# Patient Record
Sex: FEMALE | Race: BLACK OR AFRICAN AMERICAN | Employment: FULL TIME | URBAN - METROPOLITAN AREA
[De-identification: names, ages, dates, MRNs, and addresses within clinical notes are randomized per-mention and may not be internally consistent; named-entity substitution may affect disease eponyms.]

---
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
## 2018-01-08 ENCOUNTER — OFFICE VISIT (OUTPATIENT)
Dept: FAMILY MEDICINE CLINIC | Age: 18
End: 2018-01-08

## 2018-01-08 VITALS
BODY MASS INDEX: 24.07 KG/M2 | DIASTOLIC BLOOD PRESSURE: 79 MMHG | RESPIRATION RATE: 18 BRPM | HEART RATE: 81 BPM | TEMPERATURE: 98.3 F | HEIGHT: 64 IN | WEIGHT: 141 LBS | SYSTOLIC BLOOD PRESSURE: 116 MMHG | OXYGEN SATURATION: 100 %

## 2018-01-08 DIAGNOSIS — L70.0 ACNE VULGARIS: ICD-10-CM

## 2018-01-08 DIAGNOSIS — Z30.431 IUD CHECK UP: Primary | ICD-10-CM

## 2018-01-08 RX ORDER — CLINDAMYCIN PHOSPHATE AND BENZOYL PEROXIDE 10; 50 MG/G; MG/G
GEL TOPICAL
Qty: 1 TUBE | Refills: 0 | Status: SHIPPED | OUTPATIENT
Start: 2018-01-08 | End: 2018-01-28 | Stop reason: SDUPTHER

## 2018-01-08 NOTE — PROGRESS NOTES
Subjective  Don Rogers is an 21 y.o. female who presents for IUD check and acne. Mirena IUD placed April 2017. She returned in May 2017 for follow up and pelvic exam was normal - Strings were visualized at that time. She was told to follow up in 6 months but patient did not return due to insurance lapse. No pelvic pain. Some intermittent cramping. No spotting. No menstrual periods. No problems with intercourse. Acne vulgaris: on face, has tried benzoyl peroxide, Proactiv, differin gel, without mild improvement. Very bothersome to her. Past Medical History - reviewed:  History reviewed. No pertinent past medical history. ROS  PELVIC: no pelvic pain, no dysuria      Physical Exam  Visit Vitals    /79 (BP 1 Location: Right arm, BP Patient Position: Sitting)    Pulse 81    Temp 98.3 °F (36.8 °C) (Oral)    Resp 18    Ht 5' 4\" (1.626 m)    Wt 141 lb (64 kg)    LMP 07/07/2017    SpO2 100%    BMI 24.2 kg/m2       General appearance - alert, well appearing, and in no distress  Eyes - clear conjunctivae  Chest - clear to auscultation, no wheezes or rhonchi, symmetric air entry  Heart - normal rate, regular rhythm, normal S1, S2, no murmurs  Abdomen - soft, nontender, nondistended, no pelvic tenderness. Skin - some comedones and hyperpigmented acne scars present on forehead and chin. Pelvic exam: chaperoned by Dr. Melinda Suarez and medical student, Jad Murcia. Normal external genitalia, vulva, vagina, cervix, uterus and adnexa. Cervical os visualized but IUD strings are not clearly apparent. Several cotton swabs were used to clear discharge and mucus but still unable to adequately visualize strings. Bimanual exam within normal limits, uncertain if strings were apparent. Assessment/Plan    ICD-10-CM ICD-9-CM    1. IUD check up Z30.431 V25.42 US TRANSVAGINAL   2.  Acne vulgaris L70.0 706.1 clindamycin-benzoyl Peroxide (DUAC) 1.2 %(1 % base) -5 % SR topical gel     IUD check up - almost 1 year since placement. - Unable to confidently say that IUD strings are in place  - Patient examined with Dr. Squire Landau. Will obtain TVUS to ensure placement. Acne vulgaris  - topical benzoyl peroxide/clinda sent to pharmacy. (skin irritation with topical tretinoin)      Follow-up Disposition:  Return if symptoms worsen or fail to improve. I have discussed the diagnosis with the patient and the intended plan as seen in the above orders. The patient has received an after-visit summary and questions were answered concerning future plans. I have discussed medication side effects and warnings with the patient as well.       Darcy Irby MD  Family Medicine Resident

## 2018-01-08 NOTE — PATIENT INSTRUCTIONS
Acne: Care Instructions  Your Care Instructions  Acne is a skin problem that shows up as blackheads, whiteheads, and pimples. It most often affects the face, neck, and upper body. Acne occurs when oil and dead skin cells clog the skin's pores. Acne usually starts during the teen years and often lasts into adulthood. Gentle cleansing every day controls most mild acne. If home treatment does not work, your doctor may prescribe creams, antibiotics, or a stronger medicine called isotretinoin. Sometimes birth control pills help women who have monthly acne flare-ups. Follow-up care is a key part of your treatment and safety. Be sure to make and go to all appointments, and call your doctor if you are having problems. It's also a good idea to know your test results and keep a list of the medicines you take. How can you care for yourself at home? · Gently wash your face 1 or 2 times a day with warm (not hot) water and a mild soap or cleanser. Always rinse well. · Use an over-the-counter lotion or gel that contains benzoyl peroxide. Start with a small amount of 2.5% benzoyl peroxide and increase the strength as needed. Benzoyl peroxide works well for acne, but you may need to use it for up to 2 months before your acne starts to improve. · Apply acne cream, lotion, or gel to all the places you get pimples, blackheads, or whiteheads, not just where you have them now. Follow the instructions carefully. If your skin gets too dry and scaly or red and sore, reduce the amount. For the best results, apply medicines as directed. Try not to miss doses. · Do not squeeze or pick pimples and blackheads. This can cause infection and scarring. · Use only oil-free makeup, sunscreen, and other skin care products that will not clog your pores. · Wash your hair every day, and try to keep it off your face and shoulders. Consider pinning it back or cutting it short. When should you call for help?   Watch closely for changes in your health, and be sure to contact your doctor if:  ? · You have tried home treatment for 6 to 8 weeks and your acne is not better or gets worse. Your doctor may need to add to or change your treatment. ? · Your pimples become large and hard or filled with fluid. ? · Scars form after pimples heal.   ? · You feel sad or hopeless, lack energy, or have other signs of depression while you are taking the prescription medicine isotretinoin. ? · You start to have other symptoms, such as facial hair growth in women or bone and muscle pain. Where can you learn more? Go to http://mina-bradley.info/. Enter V108 in the search box to learn more about \"Acne: Care Instructions. \"  Current as of: October 13, 2016  Content Version: 11.4  © 0533-7502 [employer]. Care instructions adapted under license by [employer] (which disclaims liability or warranty for this information). If you have questions about a medical condition or this instruction, always ask your healthcare professional. [place] [address] any warranty or liability for your use of this information. Pelvic Ultrasound for Women: About This Test  What is it? A pelvic ultrasound test uses sound waves to make a picture of the inside of the lower belly (pelvis). It allows your doctor to see your bladder, cervix, uterus, fallopian tubes, and ovaries. The sound waves create a picture on a video monitor. The test can be done in two ways:  · Transabdominal. A small handheld device (transducer) is passed back and forth over your lower belly. · Transvaginal. A thin, lubricated transducer is placed in your vagina. Why is this test done? A pelvic ultrasound test is done to:  · Find the cause of urinary problems. · Find out what's causing pelvic pain. · Look for causes of vaginal bleeding and menstrual problems. · Check for growths or masses like ovarian cysts or uterine fibroids.   · See if a fertilized egg is growing outside the uterus. This is called a tubal pregnancy. · Confirm the stage of a pregnancy and check the baby's heartbeat. · Look for the correct placement of an intrauterine device (IUD). How can you prepare for the test?  · If you are having a transabdominal ultrasound, your doctor will ask you to drink 4 to 6 glasses of juice or water about an hour before the test. This will fill your bladder. If you can't fill your bladder, it can be filled with water through a thin, flexible tube (catheter). · If you are having both transabdominal and transvaginal ultrasounds done, you'll start with a full bladder. You will be asked to empty it before the transvaginal ultrasound. What happens before the test?  · You will need to remove any jewelry that might be in the way of the ultrasound. · You will need to take off most of your clothes below the waist. You'll be given a gown to wear during the test.  What happens during the test?  For a transabdominal ultrasound:  · You lie down on your back on an exam table. · A warm gel will be spread on your lower belly. This improves the transmission of the sound waves. The handheld transducer is pressed against your belly and gently moved back and forth. A picture of the organs can be seen on a video monitor. For a transvaginal ultrasound:  · You lie down on your back on an exam table with your hips slightly raised. · The tip of a thin, lubricated transducer probe is gently inserted into your vagina. The transducer may be moved around to get a complete view. The images from the test are shown on a video monitor. What else should you know about the test?  · With a transabdominal ultrasound, you will feel light pressure from the transducer as it passes over your belly. If you have an injury or pelvic pain, the pressure may be painful. · With a transvaginal ultrasound, you may feel some discomfort from the transducer probe as it is put into your vagina.   · You will not hear or feel the sound waves. How long does the test take? · The transabdominal ultrasound test will take about 30 minutes. · The transvaginal ultrasound test will take 15 to 30 minutes. What happens after the test?  · You will probably be able to go home right away. · You can go back to your usual activities right away. Follow-up care is a key part of your treatment and safety. Be sure to make and go to all appointments, and call your doctor if you are having problems. It's also a good idea to keep a list of the medicines you take. Ask your doctor when you can expect to have your test results. Where can you learn more? Go to http://mina-bradley.info/. Enter I684 in the search box to learn more about \"Pelvic Ultrasound for Women: About This Test.\"  Current as of: October 13, 2016  Content Version: 11.4  © 0812-9678 [employer]. Care instructions adapted under license by [employer] (which disclaims liability or warranty for this information). If you have questions about a medical condition or this instruction, always ask your healthcare professional. [place] [address] any warranty or liability for your use of this information.

## 2018-01-08 NOTE — PROGRESS NOTES
Chief Complaint   Patient presents with   Jovan Deeds IUD     1. Have you been to the ER, urgent care clinic since your last visit? Hospitalized since your last visit? No    2. Have you seen or consulted any other health care providers outside of the [facility] since your last visit? Include any pap smears or colon screening.  No

## 2018-01-08 NOTE — MR AVS SNAPSHOT
Visit Information Date & Time Provider Department Dept. Phone Encounter #  
 1/8/2018  9:05 AM Basia Covington, Perry Ivey Ervin [phone] [id number] Upcoming Health Maintenance Date Due Hepatitis A Peds Age 1-18 (1 of 2 - Standard Series) 7/21/1999 HPV AGE 9Y-26Y (3 of 3 - Female 3 Dose Series) 3/21/2017 Influenza Age 5 to Adult 8/1/2017 DTaP/Tdap/Td series (7 - Td) 7/29/2019 Allergies as of 1/8/2018  Review Complete On: 1/8/2018 By: Ryan Smith LPN Severity Noted Reaction Type Reactions Latex  02/29/2016    Hives Current Immunizations  Reviewed on 8/19/2016 Name Date DTaP 8/8/2003, 10/26/1999, 2/4/1999, 1998, 1998 HPV (9-valent) 11/21/2016, 8/19/2016 Hep B Vaccine 2/4/1999, 1998, 1998 Hib 10/26/1999, 2/4/1999, 1998, 1998 MMR 8/8/2003, 7/26/1999 Meningococcal (MPSV4) Vaccine 2/29/2016  2:31 PM  
 Poliovirus vaccine 8/8/2003, 7/26/1999, 1998, 1998 Tdap 7/29/2009 Varicella Virus Vaccine 2/29/2016  2:30 PM, 10/19/2000 Not reviewed this visit You Were Diagnosed With   
  
 Codes Comments IUD check up    -  Primary ICD-10-CM: K86.828 ICD-9-CM: V25.42 Acne vulgaris     ICD-10-CM: L70.0 ICD-9-CM: 706.1 Vitals BP Pulse Temp Resp Height(growth percentile) Weight(growth percentile) 116/79 (74 %/ 92 %)* (BP 1 Location: Right arm, BP Patient Position: Sitting) 81 98.3 °F (36.8 °C) (Oral) 18 5' 4\" (1.626 m) (45 %, Z= -0.11) 141 lb (64 kg) (71 %, Z= 0.57) LMP SpO2 BMI OB Status Smoking Status 07/07/2017 100% 24.2 kg/m2 (74 %, Z= 0.66) Having regular periods Never Smoker *BP percentiles are based on NHBPEP's 4th Report Growth percentiles are based on CDC 2-20 Years data. Vitals History BMI and BSA Data Body Mass Index Body Surface Area  
 24.2 kg/m 2 1.7 m 2 Preferred Pharmacy Pharmacy Name Phone [facility] [place], [address] [place] AT [facility] [phone] Your Updated Medication List  
  
   
This list is accurate as of: 1/8/18  9:26 AM.  Always use your most recent med list.  
  
  
  
  
 clindamycin-benzoyl Peroxide 1.2 %(1 % base) -5 % SR topical gel Commonly known as:  DUAC Apply  to affected area nightly. ferrous sulfate 325 mg (65 mg iron) tablet TAKE ONE TABLET BY MOUTH ONCE DAILY. TAKE WITH ORANGE JUICE  
  
 miSOPROStol 200 mcg tablet Commonly known as:  CYTOTEC Take 1 tablet by mouth the night before procedure and then 1 tablet 2 hours before procedure  
  
 norethindrone-ethinyl estradiol 1 mg-20 mcg (21)/75 mg (7) Tab Commonly known as:  Maryjean Allegra FE 1/20 Take 1 Tab by mouth daily. Prescriptions Sent to Pharmacy Refills  
 clindamycin-benzoyl Peroxide (DUAC) 1.2 %(1 % base) -5 % SR topical gel 0 Sig: Apply  to affected area nightly. Class: Normal  
 Pharmacy: [facility] [address] Ph #: [phone] Route: Topical  
  
To-Do List   
 01/12/2018 Imaging:  US TRANSVAGINAL Patient Instructions Acne: Care Instructions Your Care Instructions Acne is a skin problem that shows up as blackheads, whiteheads, and pimples. It most often affects the face, neck, and upper body. Acne occurs when oil and dead skin cells clog the skin's pores. Acne usually starts during the teen years and often lasts into adulthood. Gentle cleansing every day controls most mild acne. If home treatment does not work, your doctor may prescribe creams, antibiotics, or a stronger medicine called isotretinoin. Sometimes birth control pills help women who have monthly acne flare-ups. Follow-up care is a key part of your treatment and safety.  Be sure to make and go to all appointments, and call your doctor if you are having problems. It's also a good idea to know your test results and keep a list of the medicines you take. How can you care for yourself at home? · Gently wash your face 1 or 2 times a day with warm (not hot) water and a mild soap or cleanser. Always rinse well. · Use an over-the-counter lotion or gel that contains benzoyl peroxide. Start with a small amount of 2.5% benzoyl peroxide and increase the strength as needed. Benzoyl peroxide works well for acne, but you may need to use it for up to 2 months before your acne starts to improve. · Apply acne cream, lotion, or gel to all the places you get pimples, blackheads, or whiteheads, not just where you have them now. Follow the instructions carefully. If your skin gets too dry and scaly or red and sore, reduce the amount. For the best results, apply medicines as directed. Try not to miss doses. · Do not squeeze or pick pimples and blackheads. This can cause infection and scarring. · Use only oil-free makeup, sunscreen, and other skin care products that will not clog your pores. · Wash your hair every day, and try to keep it off your face and shoulders. Consider pinning it back or cutting it short. When should you call for help? Watch closely for changes in your health, and be sure to contact your doctor if: 
? · You have tried home treatment for 6 to 8 weeks and your acne is not better or gets worse. Your doctor may need to add to or change your treatment. ? · Your pimples become large and hard or filled with fluid. ? · Scars form after pimples heal.  
? · You feel sad or hopeless, lack energy, or have other signs of depression while you are taking the prescription medicine isotretinoin. ? · You start to have other symptoms, such as facial hair growth in women or bone and muscle pain. Where can you learn more? Go to http://mina-bradley.info/. Enter V108 in the search box to learn more about \"Acne: Care Instructions. \" 
 Current as of: October 13, 2016 Content Version: 11.4 © 7023-8512 milabent. Care instructions adapted under license by BrightBytes (which disclaims liability or warranty for this information). If you have questions about a medical condition or this instruction, always ask your healthcare professional. [address] any warranty or liability for your use of this information. Pelvic Ultrasound for Women: About This Test 
What is it? A pelvic ultrasound test uses sound waves to make a picture of the inside of the lower belly (pelvis). It allows your doctor to see your bladder, cervix, uterus, fallopian tubes, and ovaries. The sound waves create a picture on a video monitor. The test can be done in two ways: · Transabdominal. A small handheld device (transducer) is passed back and forth over your lower belly. · Transvaginal. A thin, lubricated transducer is placed in your vagina. Why is this test done? A pelvic ultrasound test is done to: · Find the cause of urinary problems. · Find out what's causing pelvic pain. · Look for causes of vaginal bleeding and menstrual problems. · Check for growths or masses like ovarian cysts or uterine fibroids. · See if a fertilized egg is growing outside the uterus. This is called a tubal pregnancy. · Confirm the stage of a pregnancy and check the baby's heartbeat. · Look for the correct placement of an intrauterine device (IUD). How can you prepare for the test? 
· If you are having a transabdominal ultrasound, your doctor will ask you to drink 4 to 6 glasses of juice or water about an hour before the test. This will fill your bladder. If you can't fill your bladder, it can be filled with water through a thin, flexible tube (catheter). · If you are having both transabdominal and transvaginal ultrasounds done, you'll start with a full bladder. You will be asked to empty it before the transvaginal ultrasound. What happens before the test? 
· You will need to remove any jewelry that might be in the way of the ultrasound. · You will need to take off most of your clothes below the waist. You'll be given a gown to wear during the test. 
What happens during the test? 
For a transabdominal ultrasound: 
· You lie down on your back on an exam table. · A warm gel will be spread on your lower belly. This improves the transmission of the sound waves. The handheld transducer is pressed against your belly and gently moved back and forth. A picture of the organs can be seen on a video monitor. For a transvaginal ultrasound: 
· You lie down on your back on an exam table with your hips slightly raised. · The tip of a thin, lubricated transducer probe is gently inserted into your vagina. The transducer may be moved around to get a complete view. The images from the test are shown on a video monitor. What else should you know about the test? 
· With a transabdominal ultrasound, you will feel light pressure from the transducer as it passes over your belly. If you have an injury or pelvic pain, the pressure may be painful. · With a transvaginal ultrasound, you may feel some discomfort from the transducer probe as it is put into your vagina. · You will not hear or feel the sound waves. How long does the test take? · The transabdominal ultrasound test will take about 30 minutes. · The transvaginal ultrasound test will take 15 to 30 minutes. What happens after the test? 
· You will probably be able to go home right away. · You can go back to your usual activities right away. Follow-up care is a key part of your treatment and safety. Be sure to make and go to all appointments, and call your doctor if you are having problems. It's also a good idea to keep a list of the medicines you take. Ask your doctor when you can expect to have your test results. Where can you learn more? Go to http://mina-bradley.info/. Enter Y318 in the search box to learn more about \"Pelvic Ultrasound for Women: About This Test.\" Current as of: October 13, 2016 Content Version: 11.4 © 6535-5264 Kinestral Technologies. Care instructions adapted under license by SnapUp (which disclaims liability or warranty for this information). If you have questions about a medical condition or this instruction, always ask your healthcare professional. [address] any warranty or liability for your use of this information. Introducing [facility] & HEALTH SERVICES! Dear Alicia Garduno: Thank you for requesting a FirstJob account. Our records indicate that you already have an active FirstJob account. You can access your account anytime at [email or web address]. Did you know that you can access your hospital and ER discharge instructions at any time in FirstJob? You can also review all of your test results from your hospital stay or ER visit. Additional Information If you have questions, please visit the Frequently Asked Questions section of the FirstJob website at [email or web address]. Remember, FirstJob is NOT to be used for urgent needs. For medical emergencies, dial 911. Now available from your iPhone and Android! Please provide this summary of care documentation to your next provider. Your primary care clinician is listed as Inocente Carbajal. If you have any questions after today's visit, please call [phone].

## 2018-01-28 DIAGNOSIS — L70.0 ACNE VULGARIS: ICD-10-CM

## 2018-01-29 RX ORDER — CLINDAMYCIN PHOSPHATE AND BENZOYL PEROXIDE 10; 50 MG/G; MG/G
GEL TOPICAL
Qty: 45 G | Refills: 0 | Status: SHIPPED | OUTPATIENT
Start: 2018-01-29 | End: 2019-02-25 | Stop reason: ALTCHOICE

## 2018-01-31 ENCOUNTER — TELEPHONE (OUTPATIENT)
Dept: FAMILY MEDICINE CLINIC | Age: 18
End: 2018-01-31

## 2018-01-31 NOTE — TELEPHONE ENCOUNTER
----- Message from Karen Shah sent at 1/31/2018 11:46 AM EST -----  Regarding: Chayito/telephone  Pt is requesting to know if you could schedule her to have an ultra sound. Pts number is [phone].

## 2018-02-07 ENCOUNTER — HOSPITAL ENCOUNTER (OUTPATIENT)
Dept: ULTRASOUND IMAGING | Age: 18
Discharge: HOME | End: 2018-02-07
Attending: FAMILY MEDICINE
Payer: MEDICAID

## 2018-02-07 DIAGNOSIS — Z30.431 IUD CHECK UP: ICD-10-CM

## 2018-02-07 DIAGNOSIS — Z30.431 FAMILY PLANNING, IUD (INTRAUTERINE DEVICE) CHECK/REINSERTION/REMOVAL: ICD-10-CM

## 2018-02-07 PROCEDURE — 76830 TRANSVAGINAL US NON-OB: CPT

## 2018-02-07 PROCEDURE — 76856 US EXAM PELVIC COMPLETE: CPT

## 2018-02-15 ENCOUNTER — TELEPHONE (OUTPATIENT)
Dept: FAMILY MEDICINE CLINIC | Age: 18
End: 2018-02-15

## 2018-02-15 NOTE — TELEPHONE ENCOUNTER
Patient called and asking if Dr. Brett Kent can just send RX to [facility] ( her employer) to get HPV vaccine there?     Pt ph [phone]    Adelina [phone]      thanks

## 2018-03-05 NOTE — TELEPHONE ENCOUNTER
Patient returned your call and canceled the scheduled upcoming appointment.       May 5, 2017  03:45 PM x cancelled:  PATIENT CANCEL NO RESCHEDULE  [facility]-MAIN OFFICE [id number]  Procedure, 30 min    04/21/2017 akober: IUD INSERTION (Edit) none

## 2018-07-03 ENCOUNTER — OFFICE VISIT (OUTPATIENT)
Dept: FAMILY MEDICINE CLINIC | Age: 18
End: 2018-07-03

## 2018-07-03 VITALS
HEIGHT: 64 IN | OXYGEN SATURATION: 98 % | TEMPERATURE: 98.5 F | SYSTOLIC BLOOD PRESSURE: 103 MMHG | RESPIRATION RATE: 16 BRPM | WEIGHT: 160 LBS | DIASTOLIC BLOOD PRESSURE: 65 MMHG | BODY MASS INDEX: 27.31 KG/M2 | HEART RATE: 84 BPM

## 2018-07-03 DIAGNOSIS — L70.8 OTHER ACNE: ICD-10-CM

## 2018-07-03 DIAGNOSIS — F41.9 ANXIETY: Primary | ICD-10-CM

## 2018-07-03 RX ORDER — BUSPIRONE HYDROCHLORIDE 7.5 MG/1
7.5 TABLET ORAL 2 TIMES DAILY
Qty: 60 TAB | Refills: 2 | Status: SHIPPED | OUTPATIENT
Start: 2018-07-03

## 2018-07-03 RX ORDER — MINOCYCLINE HYDROCHLORIDE 100 MG/1
100 CAPSULE ORAL DAILY
Qty: 30 CAP | Refills: 2 | Status: SHIPPED | OUTPATIENT
Start: 2018-07-03 | End: 2018-10-01

## 2018-07-03 NOTE — PROGRESS NOTES
Chief Complaint   Patient presents with    Other     dicuss anxiety with possible medication options    Medication Evaluation     clindamycin-benzoyl peroxide      1. Have you been to the ER, urgent care clinic since your last visit? Hospitalized since your last visit? No    2. Have you seen or consulted any other health care providers outside of the [address] [place] [address] since your last visit? Include any pap smears or colon screening.  No

## 2018-07-03 NOTE — PATIENT INSTRUCTIONS
Acne: Care Instructions  Your Care Instructions  Acne is a skin problem that shows up as blackheads, whiteheads, and pimples. It most often affects the face, neck, and upper body. Acne occurs when oil and dead skin cells clog the skin's pores. Acne usually starts during the teen years and often lasts into adulthood. Gentle cleansing every day controls most mild acne. If home treatment does not work, your doctor may prescribe creams, antibiotics, or a stronger medicine called isotretinoin. Sometimes birth control pills help women who have monthly acne flare-ups. Follow-up care is a key part of your treatment and safety. Be sure to make and go to all appointments, and call your doctor if you are having problems. It's also a good idea to know your test results and keep a list of the medicines you take. How can you care for yourself at home? · Gently wash your face 1 or 2 times a day with warm (not hot) water and a mild soap or cleanser. Always rinse well. · Use an over-the-counter lotion or gel that contains benzoyl peroxide. Start with a small amount of 2.5% benzoyl peroxide and increase the strength as needed. Benzoyl peroxide works well for acne, but you may need to use it for up to 2 months before your acne starts to improve. · Apply acne cream, lotion, or gel to all the places you get pimples, blackheads, or whiteheads, not just where you have them now. Follow the instructions carefully. If your skin gets too dry and scaly or red and sore, reduce the amount. For the best results, apply medicines as directed. Try not to miss doses. · Do not squeeze or pick pimples and blackheads. This can cause infection and scarring. · Use only oil-free makeup, sunscreen, and other skin care products that will not clog your pores. · Wash your hair every day, and try to keep it off your face and shoulders. Consider pinning it back or cutting it short. When should you call for help?   Watch closely for changes in your health, and be sure to contact your doctor if:  ? · You have tried home treatment for 6 to 8 weeks and your acne is not better or gets worse. Your doctor may need to add to or change your treatment. ? · Your pimples become large and hard or filled with fluid. ? · Scars form after pimples heal.   ? · You feel sad or hopeless, lack energy, or have other signs of depression while you are taking the prescription medicine isotretinoin. ? · You start to have other symptoms, such as facial hair growth in women or bone and muscle pain. Where can you learn more? Go to http://mina-bradley.info/. Enter V108 in the search box to learn more about \"Acne: Care Instructions. \"  Current as of: October 13, 2016  Content Version: 11.4  © 0997-6535 Adisn. Care instructions adapted under license by Smarter Grid Solutions (which disclaims liability or warranty for this information). If you have questions about a medical condition or this instruction, always ask your healthcare professional. [place] [address] any warranty or liability for your use of this information.

## 2018-07-03 NOTE — MR AVS SNAPSHOT
[address] 
[phone] Patient: Inderjit Jordan MRN: [id number] :2000 Visit Information Date & Time Provider Department Dept. Phone Encounter #  
 7/3/2018 10:00 AM José Perez MD [facility] [facility] [phone] [id number] Upcoming Health Maintenance Date Due Hepatitis A Peds Age 1-18 (1 of 2 - Standard Series) 1999 HPV Age 9Y-34Y (3 of 3 - Female 3 Dose Series) 3/21/2017 Influenza Age 5 to Adult 2018 DTaP/Tdap/Td series (7 - Td) 2019 Allergies as of 7/3/2018  Review Complete On: 7/3/2018 By: José Perez MD  
  
 Severity Noted Reaction Type Reactions Latex  2016    Hives Current Immunizations  Reviewed on 2016 Name Date DTaP 2003, 10/26/1999, 1999, 1998, 1998 HPV (9-valent) 2016, 2016 Hep B Vaccine 1999, 1998, 1998 Hib 10/26/1999, 1999, 1998, 1998 MMR 2003, 1999 Meningococcal (MPSV4) Vaccine 2016  2:31 PM  
 Poliovirus vaccine 2003, 1999, 1998, 1998 Tdap 2009 Varicella Virus Vaccine 2016  2:30 PM, 10/19/2000 Not reviewed this visit You Were Diagnosed With   
  
 Codes Comments Anxiety    -  Primary ICD-10-CM: F41.9 ICD-9-CM: 300.00 Other acne     ICD-10-CM: L70.8 ICD-9-CM: 706.1 Vitals BP Pulse Temp Resp Height(growth percentile) Weight(growth percentile) 103/65 (31 %/ 60 %)* 84 98.5 °F (36.9 °C) (Oral) 16 5' 4\" (1.626 m) (45 %, Z= -0.12) 160 lb (72.6 kg) (87 %, Z= 1.13) SpO2 BMI OB Status Smoking Status 98% 27.46 kg/m2 (88 %, Z= 1.18) IUD Never Smoker *BP percentiles are based on NHBPEP's 4th Report Growth percentiles are based on CDC 2-20 Years data. Vitals History BMI and BSA Data  Body Mass Index Body Surface Area  
 27.46 kg/m 2 1.81 m 2  
 Preferred Pharmacy Pharmacy Name Phone [facility] [address] [place] AT [facility] [phone] Your Updated Medication List  
  
   
This list is accurate as of 7/3/18 11:21 AM.  Always use your most recent med list.  
  
  
  
  
 busPIRone 7.5 mg tablet Commonly known as:  BUSPAR Take 1 Tab by mouth two (2) times a day. clindamycin-benzoyl Peroxide 1.2 %(1 % base) -5 % SR topical gel Commonly known as:  DUAC APPLY EXTERNALLY TO THE AFFECTED AREA EVERY NIGHT  
  
 ferrous sulfate 325 mg (65 mg iron) tablet TAKE ONE TABLET BY MOUTH ONCE DAILY. TAKE WITH ORANGE JUICE  
  
 minocycline 100 mg capsule Commonly known as:  Suann Paget Take 1 Cap by mouth daily for 90 days. MIRENA 20 mcg/24 hr (5 years) Iud  
Generic drug:  levonorgestrel 1 Device by IntraUTERine route once. miSOPROStol 200 mcg tablet Commonly known as:  CYTOTEC Take 1 tablet by mouth the night before procedure and then 1 tablet 2 hours before procedure  
  
 norethindrone-ethinyl estradiol 1 mg-20 mcg (21)/75 mg (7) Tab Commonly known as:  Frutoso Scriver FE 1/20 Take 1 Tab by mouth daily. Prescriptions Sent to Pharmacy Refills  
 busPIRone (BUSPAR) 7.5 mg tablet 2 Sig: Take 1 Tab by mouth two (2) times a day. Class: Normal  
 Pharmacy: [address] Ph #: [phone] Route: Oral  
 minocycline (MINOCIN, DYNACIN) 100 mg capsule 2 Sig: Take 1 Cap by mouth daily for 90 days. Class: Normal  
 Pharmacy: [address] Ph #: [phone] Route: Oral  
  
We Performed the Following AMB POC URINE PREGNANCY TEST, VISUAL COLOR COMPARISON [37012 CPT(R)] Patient Instructions Acne: Care Instructions Your Care Instructions Acne is a skin problem that shows up as blackheads, whiteheads, and pimples. It most often affects the face, neck, and upper body. Acne occurs when oil and dead skin cells clog the skin's pores. Acne usually starts during the teen years and often lasts into adulthood. Gentle cleansing every day controls most mild acne. If home treatment does not work, your doctor may prescribe creams, antibiotics, or a stronger medicine called isotretinoin. Sometimes birth control pills help women who have monthly acne flare-ups. Follow-up care is a key part of your treatment and safety. Be sure to make and go to all appointments, and call your doctor if you are having problems. It's also a good idea to know your test results and keep a list of the medicines you take. How can you care for yourself at home? · Gently wash your face 1 or 2 times a day with warm (not hot) water and a mild soap or cleanser. Always rinse well. · Use an over-the-counter lotion or gel that contains benzoyl peroxide. Start with a small amount of 2.5% benzoyl peroxide and increase the strength as needed. Benzoyl peroxide works well for acne, but you may need to use it for up to 2 months before your acne starts to improve. · Apply acne cream, lotion, or gel to all the places you get pimples, blackheads, or whiteheads, not just where you have them now. Follow the instructions carefully. If your skin gets too dry and scaly or red and sore, reduce the amount. For the best results, apply medicines as directed. Try not to miss doses. · Do not squeeze or pick pimples and blackheads. This can cause infection and scarring. · Use only oil-free makeup, sunscreen, and other skin care products that will not clog your pores. · Wash your hair every day, and try to keep it off your face and shoulders. Consider pinning it back or cutting it short. When should you call for help? Watch closely for changes in your health, and be sure to contact your doctor if: ? · You have tried home treatment for 6 to 8 weeks and your acne is not better or gets worse. Your doctor may need to add to or change your treatment. ? · Your pimples become large and hard or filled with fluid. ? · Scars form after pimples heal.  
? · You feel sad or hopeless, lack energy, or have other signs of depression while you are taking the prescription medicine isotretinoin. ? · You start to have other symptoms, such as facial hair growth in women or bone and muscle pain. Where can you learn more? Go to http://mina-bradley.info/. Enter V108 in the search box to learn more about \"Acne: Care Instructions. \" Current as of: October 13, 2016 Content Version: 11.4 © 7878-6806 TransBiodiesel. Care instructions adapted under license by MasteryConnect (which disclaims liability or warranty for this information). If you have questions about a medical condition or this instruction, always ask your healthcare professional. [place] [address] any warranty or liability for your use of this information. Introducing [facility] & HEALTH SERVICES! Dear Galina Haynes: Thank you for requesting a Z[employer] account. Our records indicate that you already have an active [employer] account. You can access your account anytime at [email or web address] Did you know that you can access your hospital and ER discharge instructions at any time in Z[employer] Corporation? You can also review all of your test results from your hospital stay or ER visit. Additional Information If you have questions, please visit the Frequently Asked Questions section of the Z[employer] website at [email or web address] Remember, Z[employer] is NOT to be used for urgent needs. For medical emergencies, dial 911. Now available from your iPhone and Android! Please provide this summary of care documentation to your next provider. Your primary care clinician is listed as Kamaljit Dunn. If you have any questions after today's visit, please call [phone].

## 2018-07-03 NOTE — PROGRESS NOTES
[address] [facility] [address] [place] [address]   Office [phone], Fax [phone]      Subjective:     CC:Anxiety and acne   History provided by patient     HPI:  Myrtice Collet is a 21 y.o. BLACK OR  female with significant history of presenting for acne follow up and Anxiety    Anxiety: Patient reports social anxiety for the past 2-3 months. Patient reports palpitations and feeling nauseous when she's around a group of people. Denies triggering incident/event. Reports that she was diagnosed with anxiety in the past and was taking Buspar. Patient denies Caffeine intake. Acne: Patient currently using topical clindamycin-benzoyl peroxide. Patient reports it helps with acne but has now dry skin. Patient requesting for alternative that she can use for acne. Patient denies smoking cigarettes, drinking alcohol or using illicit drugs. Patient is not sexually active. STEFFI-7 - Over the last 2 weeks how often have you been bothered by the following problems? (0 - not at all, 1 - several days, 2 - more then half the days, 3 nearly every day)    1. Feeling nervous, anxious or on edge? 3  2. Not being able to stop or control worrying? 1  3. Worrying too much about different things? 0  4. Trouble relaxing? 0  5. Being so restless that it is hard to sit still?0   6. Becoming easily annoyed or irritable? 0   7. Feeling afraid that something awful might happen?  2    Total score =   (5-9 mild, 10-14 moderate, > 15 severe)    ROS (bolded are positive):   General Negative for fever, chills, changes in weight, changes in appetite   HEENT Negative for hearing loss, earache, congestion, sore throat   CV Negative for chest pain, palpitations, edema   Respiratory Negative for cough, shortness of breath, wheezing   GI Negative for change in bowel habits, abdominal pain, black or bloody stools, nausea or vomiting    Negative for frequency, dysuria, hematuria, vaginal discharge   MSK Negative for back pain, joint pain, muscle pain                         No past medical history on file. Current Outpatient Prescriptions on File Prior to Visit   Medication Sig Dispense Refill    clindamycin-benzoyl Peroxide (DUAC) 1.2 %(1 % base) -5 % SR topical gel APPLY EXTERNALLY TO THE AFFECTED AREA EVERY NIGHT 45 g 0    miSOPROStol (CYTOTEC) 200 mcg tablet Take 1 tablet by mouth the night before procedure and then 1 tablet 2 hours before procedure 2 Tab 0    ferrous sulfate 325 mg (65 mg iron) tablet TAKE ONE TABLET BY MOUTH ONCE DAILY. TAKE WITH ORANGE JUICE 90 Tab 0    norethindrone-ethinyl estradiol (JUNEL FE 1/20) 1 mg-20 mcg (21)/75 mg (7) tab Take 1 Tab by mouth daily. 28 Tab 11     No current facility-administered medications on file prior to visit. Allergies   Allergen Reactions    Latex Hives       No past surgical history on file. Social History     Social History    Marital status: SINGLE     Spouse name: N/A    Number of children: N/A    Years of education: N/A     Occupational History    Not on file. Social History Main Topics    Smoking status: Never Smoker    Smokeless tobacco: Never Used    Alcohol use No    Drug use: No    Sexual activity: Yes     Partners: Male     Other Topics Concern    Not on file     Social History Narrative       Patient Active Problem List   Diagnosis Code    Dysmenorrhea N94.6    IUD (intrauterine device) in place Z97.5         Objective:   Vitals - reviewed  Visit Vitals    /65    Pulse 84    Temp 98.5 °F (36.9 °C) (Oral)    Resp 16    Ht 5' 4\" (1.626 m)    Wt 160 lb (72.6 kg)    SpO2 98%    BMI 27.46 kg/m2        Physical Exam   Constitutional: She is oriented to person, place, and time. She appears well-developed and well-nourished. HENT:   Head: Normocephalic and atraumatic. Eyes: Conjunctivae and EOM are normal. Pupils are equal, round, and reactive to light. Neck: Normal range of motion. Neck supple.    Cardiovascular: Normal rate and regular rhythm. Pulmonary/Chest: Effort normal and breath sounds normal. No respiratory distress. Abdominal: Soft. Bowel sounds are normal.   Musculoskeletal: Normal range of motion. Neurological: She is alert and oriented to person, place, and time. Skin:   Scattered mild acne             Assessment and orders:   Diagnoses and all orders for this visit:    1. Anxiety: STEFFI score of 6 with mild anxiety. Patient was on Buspar in the past and had stop taking it when she was in high school.   -  Buspar 7.5 mg BID    2. Other acne  -     AMB POC URINE PREGNANCY TEST, VISUAL COLOR COMPARISON: negative  -     Clindamycin- benzoyl peroxide with side effect of dry skin  -      Minocycline 100 mg PO daily for 3 months  -      Follow up in 3 months    Other orders  -     busPIRone (BUSPAR) 7.5 mg tablet; Take 1 Tab by mouth two (2) times a day. -     minocycline (MINOCIN, DYNACIN) 100 mg capsule; Take 1 Cap by mouth daily for 90 days. Pt was discussed with Dr. David Richard (attending physician). I have reviewed patient medical and social history and medications. I have reviewed pertinent labs results and other data. I have discussed the diagnosis with the patient and the intended plan as seen in the above orders. The patient has received an after-visit summary and questions were answered concerning future plans. I have discussed medication side effects and warnings with the patient as well.     Bettie Pan MD  Resident [facility]  07/03/18

## 2019-02-25 ENCOUNTER — OFFICE VISIT (OUTPATIENT)
Dept: FAMILY MEDICINE CLINIC | Age: 19
End: 2019-02-25

## 2019-02-25 VITALS
HEIGHT: 64 IN | RESPIRATION RATE: 18 BRPM | DIASTOLIC BLOOD PRESSURE: 76 MMHG | SYSTOLIC BLOOD PRESSURE: 111 MMHG | WEIGHT: 158 LBS | BODY MASS INDEX: 26.98 KG/M2 | OXYGEN SATURATION: 100 % | HEART RATE: 86 BPM | TEMPERATURE: 98.6 F

## 2019-02-25 DIAGNOSIS — Z01.419 WELL WOMAN EXAM: Primary | ICD-10-CM

## 2019-02-25 DIAGNOSIS — Z23 ENCOUNTER FOR IMMUNIZATION: ICD-10-CM

## 2019-02-25 RX ORDER — MINOCYCLINE HYDROCHLORIDE 100 MG/1
CAPSULE ORAL
Refills: 2 | COMMUNITY
Start: 2018-12-01

## 2019-02-25 NOTE — LETTER
Name: Mark Cameroonian   Sex: female   : 2000 [address] 
[phone] (home) Current Immunizations: 
Immunization History Administered Date(s) Administered  DTaP 2000, 1998, 1999, 10/26/1999, 2003  HPV (9-valent) 2016, 2016  Hep B Vaccine 2000, 1998, 1999  
 Hib 2000, 1998, 1999, 10/26/1999  MMR 1999, 2003  Meningococcal (MPSV4) Vaccine 2016  Poliovirus vaccine 1998, 1998, 1999, 2003  Tdap 2009  Varicella Virus Vaccine 10/19/2000, 2016 Allergies: Allergies as of 2019 - Review Complete 2019 Allergen Reaction Noted  Latex Hives 2016

## 2019-02-25 NOTE — PROGRESS NOTES
HPI:  Laya Moralez is a 19 y.o. female presenting for well woman exam.     Anxiety: Takes buspar 7.5mg prn. Pt states that she does not need to be medication. STEFFI-7: 1  PHQ 9: 0    Dysmenorrhea: IUD placed in 2017. Last menstrual period was: 06/2017  Menstrual flow: none    Sexually active: Yes  Number of sexual partners: 1  Type of sexual partners: male  Method of family planning: IUD  Does not use condoms consistently. Diet: Vegan diet with fruits, oatmeal and vegetables. Exercise: None    Allergies- reviewed: Allergies   Allergen Reactions    Latex Hives         Medications- reviewed:   Current Outpatient Medications   Medication Sig    minocycline (MINOCIN, DYNACIN) 100 mg capsule TK 1 C PO D    levonorgestrel (MIRENA) 20 mcg/24 hr (5 years) IUD 1 Device by IntraUTERine route once.  busPIRone (BUSPAR) 7.5 mg tablet Take 1 Tab by mouth two (2) times a day. No current facility-administered medications for this visit. Past Medical History- reviewed:  No past medical history on file. Past Surgical History- reviewed:   No past surgical history on file.       Family History - reviewed:  Family History   Problem Relation Age of Onset    Anemia Mother     No Known Problems Father     Other Maternal Grandfather         als         Social History - reviewed:  Social History     Socioeconomic History    Marital status: SINGLE     Spouse name: Not on file    Number of children: Not on file    Years of education: Not on file    Highest education level: Not on file   Social Needs    Financial resource strain: Not on file    Food insecurity - worry: Not on file    Food insecurity - inability: Not on file   Constant Therapy needs - medical: Not on file   Constant Therapy needs - non-medical: Not on file   Occupational History    Not on file   Tobacco Use    Smoking status: Never Smoker    Smokeless tobacco: Never Used   Substance and Sexual Activity    Alcohol use: No    Drug use: No    Sexual activity: Yes     Partners: Male   Other Topics Concern    Not on file   Social History Narrative    Not on file         Immunizations - reviewed:   Immunization History   Administered Date(s) Administered    DTaP 1998, 1998, 02/04/1999, 10/26/1999, 08/08/2003    HPV (9-valent) 08/19/2016, 11/21/2016    Hep B Vaccine 2000, 1998, 02/04/1999    Hib 1998, 1998, 02/04/1999, 10/26/1999    Influenza Vaccine 08/01/2018    MMR 07/26/1999, 08/08/2003    Meningococcal (MPSV4) Vaccine 02/29/2016    Poliovirus vaccine 1998, 1998, 07/26/1999, 08/08/2003    TB Skin Test (PPD) Intradermal 02/25/2019    Tdap 07/29/2009    Varicella Virus Vaccine 10/19/2000, 02/29/2016     Flu: 8/2018    Health Maintenance reviewed -  Pap smear: N/A  Mammogram: N/A  HIV testing: would like today    Review of Systems   CONSTITUTIONAL: Denies: fever, chills  EYES: Denies: blurry vision  ENT: Denies: sore throat, nasal congestion  CARDIOVASCULAR: Denies: chest pain, dyspnea on exertion  RESPIRATORY: Denies: cough, shortness of breath  ENDOCRINE: Denies: polydipsia/polyuria  GI: Denies: abdominal pain, flank pain  : Denies: dysuria, frequency/urgency  NEURO: Denies: dizzy/vertigo, headache  MUSCULOSKELETAL: Denies: back pain  SKIN: Denies: rash, itching  PSYCH: Denies: anxiety, depression  BREASTS: No masses or nipple discharge      Physical Exam  Visit Vitals  /76   Pulse 86   Temp 98.6 °F (37 °C)   Resp 18   Ht 5' 4\" (1.626 m)   Wt 158 lb (71.7 kg)   SpO2 100%   BMI 27.12 kg/m²     General appearance - alert, well appearing, and in no distress  Eyes - pupils equal and reactive, extraocular eye movements intact  Ears - bilateral TM's and external ear canals normal  Nose - normal and patent, no erythema, discharge or polyps  Mouth - mucous membranes moist, pharynx normal without lesions  Neck - supple, no significant adenopathy  Chest - clear to auscultation, no wheezes, rales or rhonchi, symmetric air entry  Heart - normal rate, regular rhythm, normal S1, S2, no murmurs, rubs, clicks or gallops  Abdomen - soft, nontender, nondistended, no masses or organomegaly  Neurological - alert, oriented, normal speech, no focal findings or movement disorder noted  Musculoskeletal - no joint tenderness, deformity or swelling  Extremities - peripheral pulses normal, no pedal edema, no clubbing or cyanosis  Skin - normal coloration and turgor, no rashes, no suspicious skin lesions noted    Assessment/Plan:    ICD-10-CM ICD-9-CM    1. Well woman exam Z01.419 V72.31 HIV 1/2 AG/AB, 4TH GENERATION,W RFLX CONFIRM      CHLAMYDIA/GC PCR   2. Encounter for immunization Z23 V03.89 AMB POC TUBERCULOSIS, INTRADERMAL (SKIN TEST)     · Counseled re: diet, exercise, healthy lifestyle    · Appropriate labs, vaccines, imaging studies, and referrals ordered as listed above    · Discussed the patient's BMI with her. The BMI follow up plan is as follows: I have counseled this patient on diet and exercise regimens. · Filled out school physical. Pt will return in 50 hours for TB read and second TB test.     Follow-up Disposition:  Return in about 1 year (around 2/25/2020). I have discussed the diagnosis with the patient and the intended plan as seen in the above orders. The patient has received an after-visit summary and questions were answered concerning future plans. I have discussed medication side effects and warnings with the patient as well. Informed pt to return to the office if new symptoms arise. Cristine Franco MD  Family Medicine Resident    Patient discussed with Dr. Nusrat Cerda, attending physician.

## 2019-02-27 ENCOUNTER — CLINICAL SUPPORT (OUTPATIENT)
Dept: FAMILY MEDICINE CLINIC | Age: 19
End: 2019-02-27

## 2019-02-27 DIAGNOSIS — Z11.1 ENCOUNTER FOR PPD SKIN TEST READING: Primary | ICD-10-CM

## 2019-02-27 LAB
MM INDURATION POC: NORMAL MM (ref 0–5)
PPD POC: NEGATIVE NEGATIVE

## 2019-03-05 ENCOUNTER — CLINICAL SUPPORT (OUTPATIENT)
Dept: FAMILY MEDICINE CLINIC | Age: 19
End: 2019-03-05

## 2019-03-05 VITALS
TEMPERATURE: 98.7 F | SYSTOLIC BLOOD PRESSURE: 117 MMHG | DIASTOLIC BLOOD PRESSURE: 79 MMHG | HEART RATE: 80 BPM | RESPIRATION RATE: 18 BRPM | OXYGEN SATURATION: 100 %

## 2019-03-05 DIAGNOSIS — Z11.1 SCREENING-PULMONARY TB: Primary | ICD-10-CM

## 2022-03-18 PROBLEM — N94.6 DYSMENORRHEA: Status: ACTIVE | Noted: 2017-04-29

## 2022-03-20 PROBLEM — Z97.5 IUD (INTRAUTERINE DEVICE) IN PLACE: Status: ACTIVE | Noted: 2017-04-29

## 2023-05-23 RX ORDER — BUSPIRONE HYDROCHLORIDE 7.5 MG/1
7.5 TABLET ORAL 2 TIMES DAILY
COMMUNITY
Start: 2018-07-03

## 2023-05-23 RX ORDER — MINOCYCLINE HYDROCHLORIDE 100 MG/1
CAPSULE ORAL
COMMUNITY
Start: 2018-12-01

## 2024-05-16 LAB — PAP SMEAR, EXTERNAL: NORMAL

## 2024-05-17 LAB
ABO, EXTERNAL RESULT: NORMAL
HEP B, EXTERNAL RESULT: NEGATIVE
HIV, EXTERNAL RESULT: NON REACTIVE
RH FACTOR, EXTERNAL RESULT: POSITIVE
RUBELLA TITER, EXTERNAL RESULT: NORMAL
T. PALLIDUM (SYPHILIS) ANTIBODY, EXTERNAL RESULT: NON REACTIVE

## 2024-05-19 LAB
C. TRACHOMATIS, EXTERNAL RESULT: NEGATIVE
N. GONORRHOEAE, EXTERNAL RESULT: NEGATIVE

## 2024-11-20 ENCOUNTER — HOSPITAL ENCOUNTER (INPATIENT)
Facility: HOSPITAL | Age: 24
LOS: 4 days | Discharge: HOME | DRG: 540 | End: 2024-11-24
Attending: OBSTETRICS & GYNECOLOGY | Admitting: OBSTETRICS & GYNECOLOGY
Payer: MEDICAID

## 2024-11-20 ENCOUNTER — HOSPITAL ENCOUNTER (OUTPATIENT)
Facility: HOSPITAL | Age: 24
Setting detail: OBSERVATION
Discharge: ANOTHER HOSPITAL | End: 2024-11-20
Attending: OBSTETRICS & GYNECOLOGY | Admitting: STUDENT IN AN ORGANIZED HEALTH CARE EDUCATION/TRAINING PROGRAM
Payer: MEDICAID

## 2024-11-20 VITALS
DIASTOLIC BLOOD PRESSURE: 84 MMHG | OXYGEN SATURATION: 94 % | HEART RATE: 75 BPM | RESPIRATION RATE: 18 BRPM | HEIGHT: 65 IN | TEMPERATURE: 98.3 F | SYSTOLIC BLOOD PRESSURE: 137 MMHG | BODY MASS INDEX: 38.82 KG/M2 | WEIGHT: 233 LBS

## 2024-11-20 DIAGNOSIS — Z3A.34 34 WEEKS GESTATION OF PREGNANCY: Primary | ICD-10-CM

## 2024-11-20 PROBLEM — R11.2 NAUSEA AND VOMITING: Status: ACTIVE | Noted: 2024-11-20

## 2024-11-20 LAB
ABO + RH BLD: NORMAL
ABO + RH BLD: NORMAL
ALBUMIN SERPL-MCNC: 1.7 G/DL (ref 3.5–5)
ALBUMIN SERPL-MCNC: 2 G/DL (ref 3.5–5)
ALBUMIN/GLOB SERPL: 0.5 (ref 1.1–2.2)
ALBUMIN/GLOB SERPL: 0.5 (ref 1.1–2.2)
ALP SERPL-CCNC: 189 U/L (ref 45–117)
ALP SERPL-CCNC: 231 U/L (ref 45–117)
ALT SERPL-CCNC: 18 U/L (ref 12–78)
ALT SERPL-CCNC: 24 U/L (ref 12–78)
ANION GAP SERPL CALC-SCNC: 7 MMOL/L (ref 2–12)
ANION GAP SERPL CALC-SCNC: 8 MMOL/L (ref 2–12)
APTT PPP: 26.4 SEC (ref 22.1–31)
AST SERPL-CCNC: 42 U/L (ref 15–37)
AST SERPL-CCNC: 42 U/L (ref 15–37)
BASOPHILS # BLD: 0 K/UL (ref 0–0.1)
BASOPHILS NFR BLD: 0 % (ref 0–1)
BILIRUB SERPL-MCNC: 0.3 MG/DL (ref 0.2–1)
BILIRUB SERPL-MCNC: 0.5 MG/DL (ref 0.2–1)
BLOOD GROUP ANTIBODIES SERPL: NORMAL
BLOOD GROUP ANTIBODIES SERPL: NORMAL
BUN SERPL-MCNC: 20 MG/DL (ref 6–20)
BUN SERPL-MCNC: 22 MG/DL (ref 6–20)
BUN/CREAT SERPL: 14 (ref 12–20)
BUN/CREAT SERPL: 15 (ref 12–20)
CALCIUM SERPL-MCNC: 8 MG/DL (ref 8.5–10.1)
CALCIUM SERPL-MCNC: 8.4 MG/DL (ref 8.5–10.1)
CHLORIDE SERPL-SCNC: 107 MMOL/L (ref 97–108)
CHLORIDE SERPL-SCNC: 107 MMOL/L (ref 97–108)
CO2 SERPL-SCNC: 20 MMOL/L (ref 21–32)
CO2 SERPL-SCNC: 23 MMOL/L (ref 21–32)
COMMENT:: NORMAL
CREAT SERPL-MCNC: 1.45 MG/DL (ref 0.55–1.02)
CREAT SERPL-MCNC: 1.46 MG/DL (ref 0.55–1.02)
CREAT UR-MCNC: 176 MG/DL
DIFFERENTIAL METHOD BLD: ABNORMAL
EOSINOPHIL # BLD: 0 K/UL (ref 0–0.4)
EOSINOPHIL NFR BLD: 0 % (ref 0–7)
ERYTHROCYTE [DISTWIDTH] IN BLOOD BY AUTOMATED COUNT: 14.7 % (ref 11.5–14.5)
ERYTHROCYTE [DISTWIDTH] IN BLOOD BY AUTOMATED COUNT: 14.9 % (ref 11.5–14.5)
FIBRINOGEN PPP-MCNC: 431 MG/DL (ref 200–475)
GLOBULIN SER CALC-MCNC: 3.3 G/DL (ref 2–4)
GLOBULIN SER CALC-MCNC: 3.8 G/DL (ref 2–4)
GLUCOSE SERPL-MCNC: 65 MG/DL (ref 65–100)
GLUCOSE SERPL-MCNC: 89 MG/DL (ref 65–100)
HCT VFR BLD AUTO: 34.1 % (ref 35–47)
HCT VFR BLD AUTO: 37.6 % (ref 35–47)
HGB BLD-MCNC: 11.1 G/DL (ref 11.5–16)
HGB BLD-MCNC: 12.3 G/DL (ref 11.5–16)
IMM GRANULOCYTES # BLD AUTO: 0.2 K/UL (ref 0–0.04)
IMM GRANULOCYTES NFR BLD AUTO: 2 % (ref 0–0.5)
INR PPP: 0.9 (ref 0.9–1.1)
LIPASE SERPL-CCNC: 28 U/L (ref 13–75)
LYMPHOCYTES # BLD: 1.5 K/UL (ref 0.8–3.5)
LYMPHOCYTES NFR BLD: 12 % (ref 12–49)
MCH RBC QN AUTO: 28.5 PG (ref 26–34)
MCH RBC QN AUTO: 28.5 PG (ref 26–34)
MCHC RBC AUTO-ENTMCNC: 32.6 G/DL (ref 30–36.5)
MCHC RBC AUTO-ENTMCNC: 32.7 G/DL (ref 30–36.5)
MCV RBC AUTO: 87.2 FL (ref 80–99)
MCV RBC AUTO: 87.4 FL (ref 80–99)
MONOCYTES # BLD: 0.6 K/UL (ref 0–1)
MONOCYTES NFR BLD: 5 % (ref 5–13)
NEUTS SEG # BLD: 10 K/UL (ref 1.8–8)
NEUTS SEG NFR BLD: 81 % (ref 32–75)
NRBC # BLD: 0.02 K/UL (ref 0–0.01)
NRBC # BLD: 0.03 K/UL (ref 0–0.01)
NRBC BLD-RTO: 0.2 PER 100 WBC
NRBC BLD-RTO: 0.2 PER 100 WBC
PLATELET # BLD AUTO: 194 K/UL (ref 150–400)
PLATELET # BLD AUTO: 209 K/UL (ref 150–400)
PMV BLD AUTO: 12 FL (ref 8.9–12.9)
PMV BLD AUTO: 12.3 FL (ref 8.9–12.9)
POTASSIUM SERPL-SCNC: 4.1 MMOL/L (ref 3.5–5.1)
POTASSIUM SERPL-SCNC: 4.2 MMOL/L (ref 3.5–5.1)
PROT SERPL-MCNC: 5 G/DL (ref 6.4–8.2)
PROT SERPL-MCNC: 5.8 G/DL (ref 6.4–8.2)
PROT UR-MCNC: 2944 MG/DL (ref 0–11.9)
PROT/CREAT UR-RTO: 16.7
PROTHROMBIN TIME: 9.3 SEC (ref 9–11.1)
RBC # BLD AUTO: 3.9 M/UL (ref 3.8–5.2)
RBC # BLD AUTO: 4.31 M/UL (ref 3.8–5.2)
SODIUM SERPL-SCNC: 135 MMOL/L (ref 136–145)
SODIUM SERPL-SCNC: 137 MMOL/L (ref 136–145)
SPECIMEN EXP DATE BLD: NORMAL
SPECIMEN EXP DATE BLD: NORMAL
SPECIMEN HOLD: NORMAL
THERAPEUTIC RANGE: NORMAL SECS (ref 58–77)
WBC # BLD AUTO: 12.3 K/UL (ref 3.6–11)
WBC # BLD AUTO: 16.1 K/UL (ref 3.6–11)

## 2024-11-20 PROCEDURE — 85025 COMPLETE CBC W/AUTO DIFF WBC: CPT

## 2024-11-20 PROCEDURE — 85610 PROTHROMBIN TIME: CPT

## 2024-11-20 PROCEDURE — 86850 RBC ANTIBODY SCREEN: CPT

## 2024-11-20 PROCEDURE — 36415 COLL VENOUS BLD VENIPUNCTURE: CPT

## 2024-11-20 PROCEDURE — 84156 ASSAY OF PROTEIN URINE: CPT

## 2024-11-20 PROCEDURE — 80053 COMPREHEN METABOLIC PANEL: CPT

## 2024-11-20 PROCEDURE — C1726 CATH, BAL DIL, NON-VASCULAR: HCPCS

## 2024-11-20 PROCEDURE — G0378 HOSPITAL OBSERVATION PER HR: HCPCS

## 2024-11-20 PROCEDURE — 6370000000 HC RX 637 (ALT 250 FOR IP)

## 2024-11-20 PROCEDURE — 96372 THER/PROPH/DIAG INJ SC/IM: CPT

## 2024-11-20 PROCEDURE — 2500000003 HC RX 250 WO HCPCS: Performed by: STUDENT IN AN ORGANIZED HEALTH CARE EDUCATION/TRAINING PROGRAM

## 2024-11-20 PROCEDURE — 86592 SYPHILIS TEST NON-TREP QUAL: CPT

## 2024-11-20 PROCEDURE — 59200 INSERT CERVICAL DILATOR: CPT

## 2024-11-20 PROCEDURE — 87147 CULTURE TYPE IMMUNOLOGIC: CPT

## 2024-11-20 PROCEDURE — 87081 CULTURE SCREEN ONLY: CPT

## 2024-11-20 PROCEDURE — 86900 BLOOD TYPING SEROLOGIC ABO: CPT

## 2024-11-20 PROCEDURE — 7210000100 HC LABOR FEE PER 1 HR

## 2024-11-20 PROCEDURE — 2580000003 HC RX 258: Performed by: STUDENT IN AN ORGANIZED HEALTH CARE EDUCATION/TRAINING PROGRAM

## 2024-11-20 PROCEDURE — 6360000002 HC RX W HCPCS: Performed by: OBSTETRICS & GYNECOLOGY

## 2024-11-20 PROCEDURE — 96374 THER/PROPH/DIAG INJ IV PUSH: CPT

## 2024-11-20 PROCEDURE — 82570 ASSAY OF URINE CREATININE: CPT

## 2024-11-20 PROCEDURE — 2580000003 HC RX 258: Performed by: OBSTETRICS & GYNECOLOGY

## 2024-11-20 PROCEDURE — 96375 TX/PRO/DX INJ NEW DRUG ADDON: CPT

## 2024-11-20 PROCEDURE — 83690 ASSAY OF LIPASE: CPT

## 2024-11-20 PROCEDURE — 85730 THROMBOPLASTIN TIME PARTIAL: CPT

## 2024-11-20 PROCEDURE — 83735 ASSAY OF MAGNESIUM: CPT

## 2024-11-20 PROCEDURE — 86901 BLOOD TYPING SEROLOGIC RH(D): CPT

## 2024-11-20 PROCEDURE — 6360000002 HC RX W HCPCS: Performed by: STUDENT IN AN ORGANIZED HEALTH CARE EDUCATION/TRAINING PROGRAM

## 2024-11-20 PROCEDURE — G0379 DIRECT REFER HOSPITAL OBSERV: HCPCS

## 2024-11-20 PROCEDURE — 6370000000 HC RX 637 (ALT 250 FOR IP): Performed by: STUDENT IN AN ORGANIZED HEALTH CARE EDUCATION/TRAINING PROGRAM

## 2024-11-20 PROCEDURE — 85027 COMPLETE CBC AUTOMATED: CPT

## 2024-11-20 PROCEDURE — 85384 FIBRINOGEN ACTIVITY: CPT

## 2024-11-20 PROCEDURE — 1100000000 HC RM PRIVATE

## 2024-11-20 RX ORDER — CALCIUM GLUCONATE 94 MG/ML
1000 INJECTION, SOLUTION INTRAVENOUS PRN
Status: DISCONTINUED | OUTPATIENT
Start: 2024-11-20 | End: 2024-11-20 | Stop reason: HOSPADM

## 2024-11-20 RX ORDER — ONDANSETRON 4 MG/1
4 TABLET, ORALLY DISINTEGRATING ORAL EVERY 8 HOURS PRN
Status: DISCONTINUED | OUTPATIENT
Start: 2024-11-20 | End: 2024-11-20 | Stop reason: HOSPADM

## 2024-11-20 RX ORDER — MAGNESIUM SULFATE HEPTAHYDRATE 40 MG/ML
4000 INJECTION, SOLUTION INTRAVENOUS ONCE
Status: COMPLETED | OUTPATIENT
Start: 2024-11-20 | End: 2024-11-20

## 2024-11-20 RX ORDER — SODIUM CHLORIDE, SODIUM LACTATE, POTASSIUM CHLORIDE, CALCIUM CHLORIDE 600; 310; 30; 20 MG/100ML; MG/100ML; MG/100ML; MG/100ML
INJECTION, SOLUTION INTRAVENOUS CONTINUOUS
Status: DISCONTINUED | OUTPATIENT
Start: 2024-11-20 | End: 2024-11-24 | Stop reason: HOSPADM

## 2024-11-20 RX ORDER — MAGNESIUM SULFATE HEPTAHYDRATE 40 MG/ML
2000 INJECTION, SOLUTION INTRAVENOUS ONCE
Status: DISCONTINUED | OUTPATIENT
Start: 2024-11-20 | End: 2024-11-20

## 2024-11-20 RX ORDER — SODIUM CHLORIDE 0.9 % (FLUSH) 0.9 %
5-40 SYRINGE (ML) INJECTION PRN
Status: DISCONTINUED | OUTPATIENT
Start: 2024-11-20 | End: 2024-11-20 | Stop reason: HOSPADM

## 2024-11-20 RX ORDER — CALCIUM GLUCONATE 94 MG/ML
1000 INJECTION, SOLUTION INTRAVENOUS PRN
Status: DISCONTINUED | OUTPATIENT
Start: 2024-11-20 | End: 2024-11-22

## 2024-11-20 RX ORDER — LABETALOL HYDROCHLORIDE 5 MG/ML
20 INJECTION, SOLUTION INTRAVENOUS
Status: COMPLETED | OUTPATIENT
Start: 2024-11-20 | End: 2024-11-20

## 2024-11-20 RX ORDER — SODIUM CHLORIDE, SODIUM LACTATE, POTASSIUM CHLORIDE, AND CALCIUM CHLORIDE .6; .31; .03; .02 G/100ML; G/100ML; G/100ML; G/100ML
1000 INJECTION, SOLUTION INTRAVENOUS ONCE
Status: COMPLETED | OUTPATIENT
Start: 2024-11-20 | End: 2024-11-20

## 2024-11-20 RX ORDER — SODIUM CHLORIDE 9 MG/ML
INJECTION, SOLUTION INTRAVENOUS PRN
Status: DISCONTINUED | OUTPATIENT
Start: 2024-11-20 | End: 2024-11-20 | Stop reason: HOSPADM

## 2024-11-20 RX ORDER — SODIUM CHLORIDE, SODIUM LACTATE, POTASSIUM CHLORIDE, CALCIUM CHLORIDE 600; 310; 30; 20 MG/100ML; MG/100ML; MG/100ML; MG/100ML
INJECTION, SOLUTION INTRAVENOUS CONTINUOUS
Status: DISCONTINUED | OUTPATIENT
Start: 2024-11-20 | End: 2024-11-20 | Stop reason: HOSPADM

## 2024-11-20 RX ORDER — LABETALOL HYDROCHLORIDE 5 MG/ML
20 INJECTION, SOLUTION INTRAVENOUS
Status: DISCONTINUED | OUTPATIENT
Start: 2024-11-20 | End: 2024-11-20 | Stop reason: HOSPADM

## 2024-11-20 RX ORDER — ACETAMINOPHEN 500 MG
1000 TABLET ORAL EVERY 8 HOURS SCHEDULED
Status: DISCONTINUED | OUTPATIENT
Start: 2024-11-20 | End: 2024-11-20 | Stop reason: HOSPADM

## 2024-11-20 RX ORDER — LABETALOL HYDROCHLORIDE 5 MG/ML
40 INJECTION, SOLUTION INTRAVENOUS
Status: COMPLETED | OUTPATIENT
Start: 2024-11-20 | End: 2024-11-20

## 2024-11-20 RX ORDER — BETAMETHASONE SODIUM PHOSPHATE AND BETAMETHASONE ACETATE 3; 3 MG/ML; MG/ML
12 INJECTION, SUSPENSION INTRA-ARTICULAR; INTRALESIONAL; INTRAMUSCULAR; SOFT TISSUE EVERY 24 HOURS
Status: DISCONTINUED | OUTPATIENT
Start: 2024-11-20 | End: 2024-11-20 | Stop reason: HOSPADM

## 2024-11-20 RX ORDER — BETAMETHASONE SODIUM PHOSPHATE AND BETAMETHASONE ACETATE 3; 3 MG/ML; MG/ML
12 INJECTION, SUSPENSION INTRA-ARTICULAR; INTRALESIONAL; INTRAMUSCULAR; SOFT TISSUE EVERY 24 HOURS
Status: DISCONTINUED | OUTPATIENT
Start: 2024-11-21 | End: 2024-11-21

## 2024-11-20 RX ORDER — SODIUM CHLORIDE 0.9 % (FLUSH) 0.9 %
5-40 SYRINGE (ML) INJECTION EVERY 12 HOURS SCHEDULED
Status: DISCONTINUED | OUTPATIENT
Start: 2024-11-20 | End: 2024-11-20 | Stop reason: HOSPADM

## 2024-11-20 RX ORDER — NIFEDIPINE 30 MG/1
30 TABLET, EXTENDED RELEASE ORAL DAILY
Status: DISCONTINUED | OUTPATIENT
Start: 2024-11-21 | End: 2024-11-22

## 2024-11-20 RX ORDER — NIFEDIPINE 10 MG/1
10 CAPSULE ORAL ONCE
Status: COMPLETED | OUTPATIENT
Start: 2024-11-20 | End: 2024-11-20

## 2024-11-20 RX ORDER — LABETALOL HYDROCHLORIDE 5 MG/ML
80 INJECTION, SOLUTION INTRAVENOUS
Status: DISCONTINUED | OUTPATIENT
Start: 2024-11-20 | End: 2024-11-20 | Stop reason: HOSPADM

## 2024-11-20 RX ORDER — SODIUM CHLORIDE 0.9 % (FLUSH) 0.9 %
5-40 SYRINGE (ML) INJECTION EVERY 12 HOURS SCHEDULED
Status: DISCONTINUED | OUTPATIENT
Start: 2024-11-20 | End: 2024-11-24 | Stop reason: HOSPADM

## 2024-11-20 RX ORDER — M-VIT,TX,IRON,MINS/CALC/FOLIC 27MG-0.4MG
1 TABLET ORAL DAILY
COMMUNITY

## 2024-11-20 RX ORDER — ONDANSETRON 2 MG/ML
4 INJECTION INTRAMUSCULAR; INTRAVENOUS EVERY 6 HOURS PRN
Status: DISCONTINUED | OUTPATIENT
Start: 2024-11-20 | End: 2024-11-20 | Stop reason: HOSPADM

## 2024-11-20 RX ORDER — HYDRALAZINE HYDROCHLORIDE 20 MG/ML
10 INJECTION INTRAMUSCULAR; INTRAVENOUS
Status: DISCONTINUED | OUTPATIENT
Start: 2024-11-20 | End: 2024-11-20 | Stop reason: HOSPADM

## 2024-11-20 RX ORDER — NIFEDIPINE 10 MG/1
20 CAPSULE ORAL
Status: DISCONTINUED | OUTPATIENT
Start: 2024-11-20 | End: 2024-11-20 | Stop reason: HOSPADM

## 2024-11-20 RX ORDER — NIFEDIPINE 30 MG/1
30 TABLET, EXTENDED RELEASE ORAL DAILY
Status: DISCONTINUED | OUTPATIENT
Start: 2024-11-20 | End: 2024-11-20 | Stop reason: HOSPADM

## 2024-11-20 RX ORDER — SODIUM CHLORIDE 9 MG/ML
INJECTION, SOLUTION INTRAVENOUS PRN
Status: DISCONTINUED | OUTPATIENT
Start: 2024-11-20 | End: 2024-11-24 | Stop reason: HOSPADM

## 2024-11-20 RX ORDER — SODIUM CHLORIDE 0.9 % (FLUSH) 0.9 %
5-40 SYRINGE (ML) INJECTION PRN
Status: DISCONTINUED | OUTPATIENT
Start: 2024-11-20 | End: 2024-11-24 | Stop reason: HOSPADM

## 2024-11-20 RX ORDER — ASPIRIN 81 MG/1
81 TABLET, CHEWABLE ORAL DAILY
Status: ON HOLD | COMMUNITY
End: 2024-11-24 | Stop reason: HOSPADM

## 2024-11-20 RX ORDER — NIFEDIPINE 10 MG/1
CAPSULE ORAL
Status: COMPLETED
Start: 2024-11-20 | End: 2024-11-20

## 2024-11-20 RX ADMIN — NIFEDIPINE 10 MG: 10 CAPSULE ORAL at 17:42

## 2024-11-20 RX ADMIN — NIFEDIPINE 20 MG: 10 CAPSULE ORAL at 18:15

## 2024-11-20 RX ADMIN — LABETALOL HYDROCHLORIDE 20 MG: 5 INJECTION, SOLUTION INTRAVENOUS at 16:29

## 2024-11-20 RX ADMIN — ONDANSETRON 4 MG: 2 INJECTION, SOLUTION INTRAMUSCULAR; INTRAVENOUS at 16:11

## 2024-11-20 RX ADMIN — FAMOTIDINE 20 MG: 10 INJECTION, SOLUTION INTRAVENOUS at 16:15

## 2024-11-20 RX ADMIN — BETAMETHASONE ACETATE AND BETAMETHASONE SODIUM PHOSPHATE 12 MG: 3; 3 INJECTION, SUSPENSION INTRA-ARTICULAR; INTRALESIONAL; INTRAMUSCULAR; SOFT TISSUE at 17:12

## 2024-11-20 RX ADMIN — MAGNESIUM SULFATE HEPTAHYDRATE 4000 MG: 40 INJECTION, SOLUTION INTRAVENOUS at 16:55

## 2024-11-20 RX ADMIN — NIFEDIPINE 30 MG: 30 TABLET, FILM COATED, EXTENDED RELEASE ORAL at 17:41

## 2024-11-20 RX ADMIN — OXYTOCIN 1 MILLI-UNITS/MIN: 10 INJECTION, SOLUTION INTRAMUSCULAR; INTRAVENOUS at 22:38

## 2024-11-20 RX ADMIN — SODIUM CHLORIDE, POTASSIUM CHLORIDE, SODIUM LACTATE AND CALCIUM CHLORIDE 1000 ML: 600; 310; 30; 20 INJECTION, SOLUTION INTRAVENOUS at 16:08

## 2024-11-20 RX ADMIN — MAGNESIUM SULFATE IN WATER 1000 MG/HR: 20 INJECTION, SOLUTION INTRAVENOUS at 17:10

## 2024-11-20 RX ADMIN — LABETALOL HYDROCHLORIDE 40 MG: 5 INJECTION, SOLUTION INTRAVENOUS at 16:46

## 2024-11-20 NOTE — H&P
History & Physical    Name: Chanel Dudley MRN: [id number]  SSN: [id number]    YOB: 2000  Age: 24 y.o.  Sex: female      Subjective:     Chief Complaint:  Pregnancy and nausea and vomiting    History of Present Illness: Ms. Dudley is a 24 y.o.  with an estimated gestational age of 34w6d with Estimated Date of Delivery: 24. Patient complains of nausea and vomiting for 4 days, worsening today. Has been vomiting in the morning for the past 3 days, but today has been unable to keep anything down. Also reports worsening swelling. She denies headaches, RUQ pain, vision changes, abdominal pain and any other symptoms. Pregnancy has been complicated by fetal growth restriction. Please see prenatal records which have also been sent to Labor and Delivery and added to Epic for details.    Pregnancy complicated by fetal growth restriction  24wk US EFW 5.6%ile. Torch labs negative. Most recent growth : EFW 1.6%ile (1819g), AC 2.2%ile    - positive AFP - normal detailed US      PMHx: anxiety, ADHD  No surgical history    No past medical history on file.  No past surgical history on file.  Social History     Occupational History    Not on file   Tobacco Use    Smoking status: Not on file    Smokeless tobacco: Not on file   Substance and Sexual Activity    Alcohol use: Not on file    Drug use: Not on file    Sexual activity: Not on file      No family history on file.    Allergies   Allergen Reactions    Latex Hives     Prior to Admission medications    Medication Sig Start Date End Date Taking? Authorizing Provider   busPIRone (BUSPAR) 7.5 MG tablet Take 1 tablet by mouth 2 times daily 7/3/18   Automatic Reconciliation, Ar   levonorgestrel (MIRENA) IUD 52 mg 1 Device by IntraUTERine route once    Automatic Reconciliation, Ar   minocycline (MINOCIN;DYNACIN) 100 MG capsule TK 1 C PO D 18   Automatic Reconciliation, Ar        Review of Systems:  A comprehensive review of systems was

## 2024-11-20 NOTE — PROGRESS NOTES
Patient received 20mg then 40mg Labetalol for severe range BP and BP decreased to mild range but then again was sustained severe. She received 10mg then 20mg immediate release nifedipine and BP is now normotensive. She also has been started on 30mg Nifedipine ER.    Fetal monitoring is category 1.    Coags were normal.    Transfer to [facility] has been initiated for IOL to be started upon arrival. Indication for delivery is severe preE. Indication for transfer is NICU diversion and anticipated  delivery.    GBS collected here but GBS unknown, will get PCN ppx. BMZ #1 given.  On Magnesium 1g/hr.    Patient signed out to OB Hospitalist Dr. Jim    Patient informed of plan of care. All questions answered.    Julieth Rondon MD

## 2024-11-20 NOTE — PROGRESS NOTES
1539: Pt arrived to labor and delivery following reports of increasing nausea/vomiting over the last week. Pt also states that she has tenderness below her umbilicus with palpation. Pt denies any other complaints and confirms feeling fetal movement. Pt changing into gown and oriented to room and unit.     1551: This RN updating Dr. Rondon on pt bp readings of 184/116 - RN to retake in approx 10 min per MD    1600: RN updating Dr. Rondon on pt bp reading of 185/120, MD STEVEN retake in approx 15 min    1617: Dr. Rondon at pt bedside discussing POC with pt due to severe range bp readings. MD VORIAM labetalol protocol and MD to place orders for magnesium. MD also discussing recommendation of induction with patient and if bp readings stabilize, pt wishes for transfer to another facility to avoid being  from infant in NICU after delivery. Pt verbalizing understanding and agreement.    1630: Pt stating her nausea is resolved    1646: Dr. Rondon at pt bedside further discussing POC with patient.     1656: Dr. Rondon at confirming vertex position via bedside ultrasound    1730: Dr. Rondon VORB 10mg nifedipine and 30mg XL nifedipine at this time for pt bp reading    1808: Dr. Rondon stating she is placing an order for 20mg of nifedipine at this time due to pt bp readings of 164/111 and 168/109    1840: MD updated on bp reading of 138/78. MD initiating transfer process.     1900: Bedside, Verbal, and Written shift change report given to ANDREAS Paul RN (oncoming nurse) by KIRBY Conway RN (offgoing nurse). Report included the following information Nurse Handoff Report, Index, Adult Overview, Intake/Output, MAR, and Recent Results.

## 2024-11-21 ENCOUNTER — ANESTHESIA (OUTPATIENT)
Facility: HOSPITAL | Age: 24
End: 2024-11-21
Payer: MEDICAID

## 2024-11-21 ENCOUNTER — ANESTHESIA EVENT (OUTPATIENT)
Facility: HOSPITAL | Age: 24
End: 2024-11-21
Payer: MEDICAID

## 2024-11-21 LAB
ALBUMIN SERPL-MCNC: 1.8 G/DL (ref 3.5–5)
ALBUMIN SERPL-MCNC: 1.9 G/DL (ref 3.5–5)
ALBUMIN SERPL-MCNC: 2 G/DL (ref 3.5–5)
ALBUMIN/GLOB SERPL: 0.5 (ref 1.1–2.2)
ALP SERPL-CCNC: 200 U/L (ref 45–117)
ALP SERPL-CCNC: 220 U/L (ref 45–117)
ALP SERPL-CCNC: 229 U/L (ref 45–117)
ALT SERPL-CCNC: 23 U/L (ref 12–78)
ALT SERPL-CCNC: 25 U/L (ref 12–78)
ALT SERPL-CCNC: 25 U/L (ref 12–78)
ANION GAP SERPL CALC-SCNC: 10 MMOL/L (ref 2–12)
ANION GAP SERPL CALC-SCNC: 10 MMOL/L (ref 2–12)
ANION GAP SERPL CALC-SCNC: 12 MMOL/L (ref 2–12)
AST SERPL-CCNC: 38 U/L (ref 15–37)
AST SERPL-CCNC: 41 U/L (ref 15–37)
AST SERPL-CCNC: 49 U/L (ref 15–37)
BILIRUB SERPL-MCNC: 0.2 MG/DL (ref 0.2–1)
BILIRUB SERPL-MCNC: 0.2 MG/DL (ref 0.2–1)
BILIRUB SERPL-MCNC: 0.3 MG/DL (ref 0.2–1)
BUN SERPL-MCNC: 23 MG/DL (ref 6–20)
BUN SERPL-MCNC: 24 MG/DL (ref 6–20)
BUN SERPL-MCNC: 24 MG/DL (ref 6–20)
BUN/CREAT SERPL: 15 (ref 12–20)
BUN/CREAT SERPL: 16 (ref 12–20)
BUN/CREAT SERPL: 16 (ref 12–20)
CALCIUM SERPL-MCNC: 7.7 MG/DL (ref 8.5–10.1)
CALCIUM SERPL-MCNC: 8 MG/DL (ref 8.5–10.1)
CALCIUM SERPL-MCNC: 8.7 MG/DL (ref 8.5–10.1)
CHLORIDE SERPL-SCNC: 103 MMOL/L (ref 97–108)
CHLORIDE SERPL-SCNC: 104 MMOL/L (ref 97–108)
CHLORIDE SERPL-SCNC: 105 MMOL/L (ref 97–108)
CO2 SERPL-SCNC: 19 MMOL/L (ref 21–32)
CO2 SERPL-SCNC: 20 MMOL/L (ref 21–32)
CO2 SERPL-SCNC: 21 MMOL/L (ref 21–32)
CREAT SERPL-MCNC: 1.48 MG/DL (ref 0.55–1.02)
CREAT SERPL-MCNC: 1.49 MG/DL (ref 0.55–1.02)
CREAT SERPL-MCNC: 1.5 MG/DL (ref 0.55–1.02)
ERYTHROCYTE [DISTWIDTH] IN BLOOD BY AUTOMATED COUNT: 14.8 % (ref 11.5–14.5)
ERYTHROCYTE [DISTWIDTH] IN BLOOD BY AUTOMATED COUNT: 14.9 % (ref 11.5–14.5)
ERYTHROCYTE [DISTWIDTH] IN BLOOD BY AUTOMATED COUNT: 15.1 % (ref 11.5–14.5)
GLOBULIN SER CALC-MCNC: 3.5 G/DL (ref 2–4)
GLOBULIN SER CALC-MCNC: 3.7 G/DL (ref 2–4)
GLOBULIN SER CALC-MCNC: 3.9 G/DL (ref 2–4)
GLUCOSE SERPL-MCNC: 102 MG/DL (ref 65–100)
GLUCOSE SERPL-MCNC: 116 MG/DL (ref 65–100)
GLUCOSE SERPL-MCNC: 116 MG/DL (ref 65–100)
HCT VFR BLD AUTO: 36 % (ref 35–47)
HCT VFR BLD AUTO: 36.6 % (ref 35–47)
HCT VFR BLD AUTO: 40.6 % (ref 35–47)
HGB BLD-MCNC: 12 G/DL (ref 11.5–16)
HGB BLD-MCNC: 12 G/DL (ref 11.5–16)
HGB BLD-MCNC: 13.5 G/DL (ref 11.5–16)
LDH SERPL L TO P-CCNC: 555 U/L (ref 81–246)
MCH RBC QN AUTO: 28.2 PG (ref 26–34)
MCH RBC QN AUTO: 28.4 PG (ref 26–34)
MCH RBC QN AUTO: 28.6 PG (ref 26–34)
MCHC RBC AUTO-ENTMCNC: 32.8 G/DL (ref 30–36.5)
MCHC RBC AUTO-ENTMCNC: 33.3 G/DL (ref 30–36.5)
MCHC RBC AUTO-ENTMCNC: 33.3 G/DL (ref 30–36.5)
MCV RBC AUTO: 84.9 FL (ref 80–99)
MCV RBC AUTO: 85.9 FL (ref 80–99)
MCV RBC AUTO: 86.7 FL (ref 80–99)
NRBC # BLD: 0.03 K/UL (ref 0–0.01)
NRBC # BLD: 0.04 K/UL (ref 0–0.01)
NRBC # BLD: 0.04 K/UL (ref 0–0.01)
NRBC BLD-RTO: 0.2 PER 100 WBC
PLATELET # BLD AUTO: 208 K/UL (ref 150–400)
PLATELET # BLD AUTO: 219 K/UL (ref 150–400)
PLATELET # BLD AUTO: 245 K/UL (ref 150–400)
PMV BLD AUTO: 12.7 FL (ref 8.9–12.9)
PMV BLD AUTO: 12.8 FL (ref 8.9–12.9)
PMV BLD AUTO: 12.8 FL (ref 8.9–12.9)
POTASSIUM SERPL-SCNC: 4.2 MMOL/L (ref 3.5–5.1)
POTASSIUM SERPL-SCNC: 4.3 MMOL/L (ref 3.5–5.1)
POTASSIUM SERPL-SCNC: 4.8 MMOL/L (ref 3.5–5.1)
PROT SERPL-MCNC: 5.4 G/DL (ref 6.4–8.2)
PROT SERPL-MCNC: 5.5 G/DL (ref 6.4–8.2)
PROT SERPL-MCNC: 5.9 G/DL (ref 6.4–8.2)
RBC # BLD AUTO: 4.19 M/UL (ref 3.8–5.2)
RBC # BLD AUTO: 4.22 M/UL (ref 3.8–5.2)
RBC # BLD AUTO: 4.78 M/UL (ref 3.8–5.2)
RPR SER QL: NONREACTIVE
SODIUM SERPL-SCNC: 133 MMOL/L (ref 136–145)
SODIUM SERPL-SCNC: 135 MMOL/L (ref 136–145)
SODIUM SERPL-SCNC: 136 MMOL/L (ref 136–145)
THERAPEUTIC MAGNESIUM: 4.8 MG/DL (ref 4.8–8.4)
THERAPEUTIC MAGNESIUM: 5.4 MG/DL (ref 4.8–8.4)
THERAPEUTIC MAGNESIUM: 6 MG/DL (ref 4.8–8.4)
THERAPEUTIC MAGNESIUM: 6.1 MG/DL (ref 4.8–8.4)
WBC # BLD AUTO: 17.2 K/UL (ref 3.6–11)
WBC # BLD AUTO: 19.7 K/UL (ref 3.6–11)
WBC # BLD AUTO: 26.2 K/UL (ref 3.6–11)

## 2024-11-21 PROCEDURE — 3E033VJ INTRODUCTION OF OTHER HORMONE INTO PERIPHERAL VEIN, PERCUTANEOUS APPROACH: ICD-10-PCS | Performed by: STUDENT IN AN ORGANIZED HEALTH CARE EDUCATION/TRAINING PROGRAM

## 2024-11-21 PROCEDURE — 2580000003 HC RX 258: Performed by: STUDENT IN AN ORGANIZED HEALTH CARE EDUCATION/TRAINING PROGRAM

## 2024-11-21 PROCEDURE — 7100000001 HC PACU RECOVERY - ADDTL 15 MIN: Performed by: STUDENT IN AN ORGANIZED HEALTH CARE EDUCATION/TRAINING PROGRAM

## 2024-11-21 PROCEDURE — 85027 COMPLETE CBC AUTOMATED: CPT

## 2024-11-21 PROCEDURE — 6360000002 HC RX W HCPCS

## 2024-11-21 PROCEDURE — 6360000002 HC RX W HCPCS: Performed by: OBSTETRICS & GYNECOLOGY

## 2024-11-21 PROCEDURE — 2709999900 HC NON-CHARGEABLE SUPPLY: Performed by: STUDENT IN AN ORGANIZED HEALTH CARE EDUCATION/TRAINING PROGRAM

## 2024-11-21 PROCEDURE — 3700000000 HC ANESTHESIA ATTENDED CARE: Performed by: STUDENT IN AN ORGANIZED HEALTH CARE EDUCATION/TRAINING PROGRAM

## 2024-11-21 PROCEDURE — 83735 ASSAY OF MAGNESIUM: CPT

## 2024-11-21 PROCEDURE — 6360000002 HC RX W HCPCS: Performed by: NURSE ANESTHETIST, CERTIFIED REGISTERED

## 2024-11-21 PROCEDURE — 1120000000 HC RM PRIVATE OB

## 2024-11-21 PROCEDURE — 2580000003 HC RX 258: Performed by: NURSE ANESTHETIST, CERTIFIED REGISTERED

## 2024-11-21 PROCEDURE — 3700000001 HC ADD 15 MINUTES (ANESTHESIA): Performed by: STUDENT IN AN ORGANIZED HEALTH CARE EDUCATION/TRAINING PROGRAM

## 2024-11-21 PROCEDURE — 2580000003 HC RX 258: Performed by: OBSTETRICS & GYNECOLOGY

## 2024-11-21 PROCEDURE — 6370000000 HC RX 637 (ALT 250 FOR IP): Performed by: OBSTETRICS & GYNECOLOGY

## 2024-11-21 PROCEDURE — 6360000002 HC RX W HCPCS: Performed by: ANESTHESIOLOGY

## 2024-11-21 PROCEDURE — 36415 COLL VENOUS BLD VENIPUNCTURE: CPT

## 2024-11-21 PROCEDURE — 7210000100 HC LABOR FEE PER 1 HR

## 2024-11-21 PROCEDURE — 7100000000 HC PACU RECOVERY - FIRST 15 MIN: Performed by: STUDENT IN AN ORGANIZED HEALTH CARE EDUCATION/TRAINING PROGRAM

## 2024-11-21 PROCEDURE — 88305 TISSUE EXAM BY PATHOLOGIST: CPT

## 2024-11-21 PROCEDURE — 83615 LACTATE (LD) (LDH) ENZYME: CPT

## 2024-11-21 PROCEDURE — 88307 TISSUE EXAM BY PATHOLOGIST: CPT

## 2024-11-21 PROCEDURE — 3700000025 EPIDURAL BLOCK: Performed by: ANESTHESIOLOGY

## 2024-11-21 PROCEDURE — 6360000002 HC RX W HCPCS: Performed by: STUDENT IN AN ORGANIZED HEALTH CARE EDUCATION/TRAINING PROGRAM

## 2024-11-21 PROCEDURE — 10907ZC DRAINAGE OF AMNIOTIC FLUID, THERAPEUTIC FROM PRODUCTS OF CONCEPTION, VIA NATURAL OR ARTIFICIAL OPENING: ICD-10-PCS | Performed by: STUDENT IN AN ORGANIZED HEALTH CARE EDUCATION/TRAINING PROGRAM

## 2024-11-21 PROCEDURE — 3609079900 HC CESAREAN SECTION: Performed by: STUDENT IN AN ORGANIZED HEALTH CARE EDUCATION/TRAINING PROGRAM

## 2024-11-21 PROCEDURE — 2500000003 HC RX 250 WO HCPCS: Performed by: ANESTHESIOLOGY

## 2024-11-21 PROCEDURE — 80053 COMPREHEN METABOLIC PANEL: CPT

## 2024-11-21 RX ORDER — EPHEDRINE SULFATE 50 MG/ML
INJECTION INTRAVENOUS
Status: DISCONTINUED
Start: 2024-11-21 | End: 2024-11-21 | Stop reason: WASHOUT

## 2024-11-21 RX ORDER — SODIUM CHLORIDE 0.9 % (FLUSH) 0.9 %
5-40 SYRINGE (ML) INJECTION EVERY 12 HOURS SCHEDULED
Status: DISCONTINUED | OUTPATIENT
Start: 2024-11-21 | End: 2024-11-24 | Stop reason: HOSPADM

## 2024-11-21 RX ORDER — LABETALOL HYDROCHLORIDE 5 MG/ML
40 INJECTION, SOLUTION INTRAVENOUS
Status: ACTIVE | OUTPATIENT
Start: 2024-11-21 | End: 2024-11-22

## 2024-11-21 RX ORDER — ONDANSETRON 4 MG/1
4 TABLET, ORALLY DISINTEGRATING ORAL EVERY 8 HOURS PRN
Status: DISCONTINUED | OUTPATIENT
Start: 2024-11-21 | End: 2024-11-24 | Stop reason: HOSPADM

## 2024-11-21 RX ORDER — LIDOCAINE HCL/EPINEPHRINE/PF 2%-1:200K
VIAL (ML) INJECTION
Status: COMPLETED
Start: 2024-11-21 | End: 2024-11-21

## 2024-11-21 RX ORDER — SODIUM CHLORIDE 0.9 % (FLUSH) 0.9 %
5-40 SYRINGE (ML) INJECTION PRN
Status: DISCONTINUED | OUTPATIENT
Start: 2024-11-21 | End: 2024-11-24 | Stop reason: HOSPADM

## 2024-11-21 RX ORDER — LABETALOL HYDROCHLORIDE 5 MG/ML
20 INJECTION, SOLUTION INTRAVENOUS
Status: COMPLETED | OUTPATIENT
Start: 2024-11-21 | End: 2024-11-21

## 2024-11-21 RX ORDER — ACETAMINOPHEN 500 MG
1000 TABLET ORAL EVERY 8 HOURS SCHEDULED
Status: DISCONTINUED | OUTPATIENT
Start: 2024-11-21 | End: 2024-11-24 | Stop reason: HOSPADM

## 2024-11-21 RX ORDER — MIDAZOLAM HYDROCHLORIDE 1 MG/ML
INJECTION, SOLUTION INTRAMUSCULAR; INTRAVENOUS
Status: DISCONTINUED | OUTPATIENT
Start: 2024-11-21 | End: 2024-11-21 | Stop reason: SDUPTHER

## 2024-11-21 RX ORDER — MISOPROSTOL 200 UG/1
800 TABLET ORAL PRN
Status: DISCONTINUED | OUTPATIENT
Start: 2024-11-21 | End: 2024-11-24 | Stop reason: HOSPADM

## 2024-11-21 RX ORDER — IBUPROFEN 400 MG/1
800 TABLET, FILM COATED ORAL EVERY 8 HOURS
Status: DISCONTINUED | OUTPATIENT
Start: 2024-11-22 | End: 2024-11-24 | Stop reason: HOSPADM

## 2024-11-21 RX ORDER — HYDRALAZINE HYDROCHLORIDE 20 MG/ML
10 INJECTION INTRAMUSCULAR; INTRAVENOUS
Status: ACTIVE | OUTPATIENT
Start: 2024-11-21 | End: 2024-11-22

## 2024-11-21 RX ORDER — KETOROLAC TROMETHAMINE 30 MG/ML
15 INJECTION, SOLUTION INTRAMUSCULAR; INTRAVENOUS EVERY 6 HOURS
Status: ACTIVE | OUTPATIENT
Start: 2024-11-21 | End: 2024-11-22

## 2024-11-21 RX ORDER — SODIUM CHLORIDE 9 MG/ML
INJECTION, SOLUTION INTRAVENOUS PRN
Status: DISCONTINUED | OUTPATIENT
Start: 2024-11-21 | End: 2024-11-24 | Stop reason: HOSPADM

## 2024-11-21 RX ORDER — ONDANSETRON 2 MG/ML
4 INJECTION INTRAMUSCULAR; INTRAVENOUS EVERY 6 HOURS PRN
Status: DISCONTINUED | OUTPATIENT
Start: 2024-11-21 | End: 2024-11-24 | Stop reason: HOSPADM

## 2024-11-21 RX ORDER — ONDANSETRON 2 MG/ML
4 INJECTION INTRAMUSCULAR; INTRAVENOUS EVERY 6 HOURS PRN
Status: DISCONTINUED | OUTPATIENT
Start: 2024-11-21 | End: 2024-11-21 | Stop reason: HOSPADM

## 2024-11-21 RX ORDER — SODIUM CHLORIDE, SODIUM LACTATE, POTASSIUM CHLORIDE, CALCIUM CHLORIDE 600; 310; 30; 20 MG/100ML; MG/100ML; MG/100ML; MG/100ML
INJECTION, SOLUTION INTRAVENOUS CONTINUOUS
Status: DISCONTINUED | OUTPATIENT
Start: 2024-11-21 | End: 2024-11-24 | Stop reason: HOSPADM

## 2024-11-21 RX ORDER — FENTANYL/BUPIVACAINE/NS/PF 2-1250MCG
10 PLASTIC BAG, INJECTION (ML) INJECTION CONTINUOUS
Status: DISCONTINUED | OUTPATIENT
Start: 2024-11-21 | End: 2024-11-21 | Stop reason: HOSPADM

## 2024-11-21 RX ORDER — KETOROLAC TROMETHAMINE 30 MG/ML
INJECTION, SOLUTION INTRAMUSCULAR; INTRAVENOUS
Status: DISCONTINUED | OUTPATIENT
Start: 2024-11-21 | End: 2024-11-21 | Stop reason: SDUPTHER

## 2024-11-21 RX ORDER — SODIUM CHLORIDE, SODIUM LACTATE, POTASSIUM CHLORIDE, CALCIUM CHLORIDE 600; 310; 30; 20 MG/100ML; MG/100ML; MG/100ML; MG/100ML
INJECTION, SOLUTION INTRAVENOUS
Status: DISCONTINUED | OUTPATIENT
Start: 2024-11-21 | End: 2024-11-21 | Stop reason: SDUPTHER

## 2024-11-21 RX ORDER — LABETALOL HYDROCHLORIDE 5 MG/ML
80 INJECTION, SOLUTION INTRAVENOUS
Status: ACTIVE | OUTPATIENT
Start: 2024-11-21 | End: 2024-11-22

## 2024-11-21 RX ORDER — BUPIVACAINE HYDROCHLORIDE 2.5 MG/ML
INJECTION, SOLUTION EPIDURAL; INFILTRATION; INTRACAUDAL
Status: DISCONTINUED | OUTPATIENT
Start: 2024-11-21 | End: 2024-11-21 | Stop reason: SDUPTHER

## 2024-11-21 RX ORDER — DEXAMETHASONE SODIUM PHOSPHATE 4 MG/ML
INJECTION, SOLUTION INTRA-ARTICULAR; INTRALESIONAL; INTRAMUSCULAR; INTRAVENOUS; SOFT TISSUE
Status: DISCONTINUED | OUTPATIENT
Start: 2024-11-21 | End: 2024-11-21 | Stop reason: SDUPTHER

## 2024-11-21 RX ORDER — SIMETHICONE 80 MG
80 TABLET,CHEWABLE ORAL EVERY 6 HOURS PRN
Status: DISCONTINUED | OUTPATIENT
Start: 2024-11-21 | End: 2024-11-24 | Stop reason: HOSPADM

## 2024-11-21 RX ORDER — FENTANYL CITRATE 50 UG/ML
INJECTION, SOLUTION INTRAMUSCULAR; INTRAVENOUS
Status: DISPENSED
Start: 2024-11-21 | End: 2024-11-22

## 2024-11-21 RX ORDER — LABETALOL HYDROCHLORIDE 5 MG/ML
INJECTION, SOLUTION INTRAVENOUS
Status: COMPLETED
Start: 2024-11-21 | End: 2024-11-21

## 2024-11-21 RX ORDER — OXYCODONE HYDROCHLORIDE 5 MG/1
10 TABLET ORAL EVERY 4 HOURS PRN
Status: DISCONTINUED | OUTPATIENT
Start: 2024-11-22 | End: 2024-11-24 | Stop reason: HOSPADM

## 2024-11-21 RX ORDER — ONDANSETRON 2 MG/ML
INJECTION INTRAMUSCULAR; INTRAVENOUS
Status: DISCONTINUED | OUTPATIENT
Start: 2024-11-21 | End: 2024-11-21 | Stop reason: SDUPTHER

## 2024-11-21 RX ORDER — SODIUM CHLORIDE, SODIUM LACTATE, POTASSIUM CHLORIDE, CALCIUM CHLORIDE 600; 310; 30; 20 MG/100ML; MG/100ML; MG/100ML; MG/100ML
INJECTION, SOLUTION INTRAVENOUS CONTINUOUS
Status: DISPENSED | OUTPATIENT
Start: 2024-11-21 | End: 2024-11-22

## 2024-11-21 RX ORDER — OXYCODONE HYDROCHLORIDE 5 MG/1
5 TABLET ORAL EVERY 4 HOURS PRN
Status: DISCONTINUED | OUTPATIENT
Start: 2024-11-22 | End: 2024-11-24 | Stop reason: HOSPADM

## 2024-11-21 RX ORDER — LABETALOL HYDROCHLORIDE 5 MG/ML
20 INJECTION, SOLUTION INTRAVENOUS ONCE
Status: COMPLETED | OUTPATIENT
Start: 2024-11-21 | End: 2024-11-21

## 2024-11-21 RX ORDER — MODIFIED LANOLIN
OINTMENT (GRAM) TOPICAL
Status: DISCONTINUED | OUTPATIENT
Start: 2024-11-21 | End: 2024-11-24 | Stop reason: HOSPADM

## 2024-11-21 RX ORDER — DOCUSATE SODIUM 100 MG/1
100 CAPSULE, LIQUID FILLED ORAL 2 TIMES DAILY PRN
Status: DISCONTINUED | OUTPATIENT
Start: 2024-11-21 | End: 2024-11-22

## 2024-11-21 RX ORDER — POLYETHYLENE GLYCOL 3350 17 G/17G
17 POWDER, FOR SOLUTION ORAL DAILY PRN
Status: DISCONTINUED | OUTPATIENT
Start: 2024-11-21 | End: 2024-11-24 | Stop reason: HOSPADM

## 2024-11-21 RX ORDER — FENTANYL CITRATE 50 UG/ML
INJECTION, SOLUTION INTRAMUSCULAR; INTRAVENOUS
Status: DISCONTINUED | OUTPATIENT
Start: 2024-11-21 | End: 2024-11-21 | Stop reason: SDUPTHER

## 2024-11-21 RX ORDER — EPHEDRINE SULFATE 50 MG/ML
5 INJECTION INTRAVENOUS PRN
Status: DISCONTINUED | OUTPATIENT
Start: 2024-11-22 | End: 2024-11-21 | Stop reason: HOSPADM

## 2024-11-21 RX ORDER — LIDOCAINE HCL/EPINEPHRINE/PF 2%-1:200K
VIAL (ML) INJECTION
Status: DISCONTINUED | OUTPATIENT
Start: 2024-11-21 | End: 2024-11-21 | Stop reason: SDUPTHER

## 2024-11-21 RX ORDER — MORPHINE SULFATE 0.5 MG/ML
INJECTION, SOLUTION EPIDURAL; INTRATHECAL; INTRAVENOUS
Status: DISCONTINUED | OUTPATIENT
Start: 2024-11-21 | End: 2024-11-21 | Stop reason: SDUPTHER

## 2024-11-21 RX ORDER — EPHEDRINE SULFATE 50 MG/ML
10 INJECTION INTRAVENOUS
Status: DISCONTINUED | OUTPATIENT
Start: 2024-11-21 | End: 2024-11-21 | Stop reason: HOSPADM

## 2024-11-21 RX ORDER — NALOXONE HYDROCHLORIDE 0.4 MG/ML
INJECTION, SOLUTION INTRAMUSCULAR; INTRAVENOUS; SUBCUTANEOUS PRN
Status: DISCONTINUED | OUTPATIENT
Start: 2024-11-21 | End: 2024-11-21 | Stop reason: HOSPADM

## 2024-11-21 RX ORDER — BUPIVACAINE HYDROCHLORIDE 2.5 MG/ML
INJECTION, SOLUTION EPIDURAL; INFILTRATION; INTRACAUDAL
Status: COMPLETED
Start: 2024-11-21 | End: 2024-11-21

## 2024-11-21 RX ADMIN — BUPIVACAINE HYDROCHLORIDE 5 ML: 2.5 INJECTION, SOLUTION EPIDURAL; INFILTRATION; INTRACAUDAL; PERINEURAL at 13:13

## 2024-11-21 RX ADMIN — LABETALOL HYDROCHLORIDE 20 MG: 5 INJECTION INTRAVENOUS at 19:45

## 2024-11-21 RX ADMIN — LIDOCAINE HYDROCHLORIDE AND EPINEPHRINE 5 ML: 20; 5 INJECTION, SOLUTION EPIDURAL; INFILTRATION; INTRACAUDAL; PERINEURAL at 14:38

## 2024-11-21 RX ADMIN — KETOROLAC TROMETHAMINE 30 MG: 30 INJECTION, SOLUTION INTRAMUSCULAR; INTRAVENOUS at 15:43

## 2024-11-21 RX ADMIN — DEXAMETHASONE SODIUM PHOSPHATE 4 MG: 4 INJECTION, SOLUTION INTRAMUSCULAR; INTRAVENOUS at 15:26

## 2024-11-21 RX ADMIN — CEFAZOLIN 2000 MG: 1 INJECTION, POWDER, FOR SOLUTION INTRAMUSCULAR; INTRAVENOUS at 14:42

## 2024-11-21 RX ADMIN — NIFEDIPINE 30 MG: 30 TABLET, EXTENDED RELEASE ORAL at 09:00

## 2024-11-21 RX ADMIN — PENICILLIN G POTASSIUM 5 MILLION UNITS: 5000000 INJECTION, POWDER, FOR SOLUTION INTRAMUSCULAR; INTRAVENOUS at 10:11

## 2024-11-21 RX ADMIN — FENTANYL CITRATE 100 MCG: 50 INJECTION, SOLUTION INTRAMUSCULAR; INTRAVENOUS at 13:11

## 2024-11-21 RX ADMIN — MAGNESIUM SULFATE HEPTAHYDRATE 1000 MG/HR: 40 INJECTION, SOLUTION INTRAVENOUS at 16:16

## 2024-11-21 RX ADMIN — MORPHINE SULFATE 5 MG: 0.5 INJECTION, SOLUTION EPIDURAL; INTRATHECAL; INTRAVENOUS at 15:51

## 2024-11-21 RX ADMIN — Medication 10 ML/HR: at 13:19

## 2024-11-21 RX ADMIN — MIDAZOLAM 2 MG: 1 INJECTION INTRAMUSCULAR; INTRAVENOUS at 15:36

## 2024-11-21 RX ADMIN — SODIUM CHLORIDE, POTASSIUM CHLORIDE, SODIUM LACTATE AND CALCIUM CHLORIDE: 600; 310; 30; 20 INJECTION, SOLUTION INTRAVENOUS at 16:12

## 2024-11-21 RX ADMIN — SODIUM CHLORIDE, POTASSIUM CHLORIDE, SODIUM LACTATE AND CALCIUM CHLORIDE: 600; 310; 30; 20 INJECTION, SOLUTION INTRAVENOUS at 13:14

## 2024-11-21 RX ADMIN — LABETALOL HYDROCHLORIDE 20 MG: 5 INJECTION, SOLUTION INTRAVENOUS at 12:22

## 2024-11-21 RX ADMIN — ONDANSETRON HYDROCHLORIDE 4 MG: 2 INJECTION, SOLUTION INTRAMUSCULAR; INTRAVENOUS at 15:36

## 2024-11-21 RX ADMIN — SODIUM CHLORIDE, POTASSIUM CHLORIDE, SODIUM LACTATE AND CALCIUM CHLORIDE: 600; 310; 30; 20 INJECTION, SOLUTION INTRAVENOUS at 14:47

## 2024-11-21 RX ADMIN — LABETALOL HYDROCHLORIDE 20 MG: 5 INJECTION INTRAVENOUS at 12:22

## 2024-11-21 RX ADMIN — LIDOCAINE HYDROCHLORIDE AND EPINEPHRINE 5 ML: 20; 5 INJECTION, SOLUTION EPIDURAL; INFILTRATION; INTRACAUDAL; PERINEURAL at 13:09

## 2024-11-21 RX ADMIN — ONDANSETRON HYDROCHLORIDE 4 MG: 2 INJECTION, SOLUTION INTRAMUSCULAR; INTRAVENOUS at 15:26

## 2024-11-21 RX ADMIN — LABETALOL HYDROCHLORIDE 20 MG: 5 INJECTION INTRAVENOUS at 17:21

## 2024-11-21 RX ADMIN — SODIUM CHLORIDE, POTASSIUM CHLORIDE, SODIUM LACTATE AND CALCIUM CHLORIDE: 600; 310; 30; 20 INJECTION, SOLUTION INTRAVENOUS at 04:47

## 2024-11-21 RX ADMIN — MAGNESIUM SULFATE HEPTAHYDRATE 1000 MG/HR: 40 INJECTION, SOLUTION INTRAVENOUS at 14:15

## 2024-11-21 RX ADMIN — LIDOCAINE HYDROCHLORIDE AND EPINEPHRINE 5 ML: 20; 5 INJECTION, SOLUTION EPIDURAL; INFILTRATION; INTRACAUDAL; PERINEURAL at 14:45

## 2024-11-21 RX ADMIN — LIDOCAINE HYDROCHLORIDE AND EPINEPHRINE 5 ML: 20; 5 INJECTION, SOLUTION EPIDURAL; INFILTRATION; INTRACAUDAL; PERINEURAL at 14:51

## 2024-11-21 NOTE — OP NOTE
Operative Note  Patient - Chanel Dudley  Medical Record Number - [id number]   YOB: 2000    DATE AND TIME OF PROCEDURE: 2024 4:05 PM     PREOPERATIVE DIAGNOSIS:   PreE w/SF  Severe fetal growth restriction   Elevated AFP- normal anatomical survey   Anxiety- no meds   ADHD- no meds     POSTOPERATIVE DIAGNOSIS: Same, status post  delivery     PROCEDURE(S): Procedure(s):   SECTION     ANESTHESIA: Epidural    SURGEON:  Jessica Stevens MD    ASSISTANT: First Assistant: Celeste Young RN     QUANTITATIVE BLOOD LOSS AT PROCEDURE END: pending documentation     COMPLICATIONS: none     IMPLANTS: *No implants in the log*    SPECIMENS: 1. Placenta 2. Intrauterine deposits     FINDINGS: Normal uterus, ovaries, and fallopian tubes. Clear amniotic fluid. After delivery of placenta multiple cystic purplish-blue intrauterine deposits noted, possibly just retroplacental clot, but given patient's elevated AFP these were sent for pathological examination separately from the placenta.     Prophylactic Antibiotics: Ancef    DVT Prophylaxis: Sequential Compression Devices    Fetal Description: baltazar     Birth Information:   Information for the patient's :  RY Dudley [[id number]]   @[id number]@    Umbilical Cord: 3 vessels present    Placenta:  spontaneous        Procedure Detail:      After proper patient identification and consent, the patient was taken to the operating room, where spinal anesthesia was administered and found to be adequate. Montoya catheter had been placed using sterile technique.  The patient was prepped and draped in the normal sterile fashion.The abdomen was entered using the Pfannenstiel technique. The peritoneum was entered sharply well superior to the bladder without any apparent injury. The Asad retractor was placed with care not to entrap any bowel or omentum. The bladder flap was created without difficulty. A low transverse uterine

## 2024-11-21 NOTE — L&D DELIVERY NOTE
RY Dudley [[id number]]      Labor Events     Labor: No   Steroids: None  Cervical Ripening Date/Time:      Cervical Ripening Type: Montoya/EASI  Rupture Identifier: Sac 1  Rupture Date/Time:  24 15:09:00   Rupture Type: AROM  Fluid Color: Clear  Induction: Cervical Ripening Balloon  Augmentation: Oxytocin  Labor Complications: Fetal Intolerance       Anesthesia    Method: Epidural       Labor Event Times      Labor onset date/time:  24 12:00:00     Dilation complete date/time:        Start pushing date/time:     Decision date/time (emergent ):  2024 14:03:00          Delivery Details      Delivery Date: 24 Delivery Time: 15:09:00   Delivery Type: , Low Transverse  Trial of Labor?: Yes   Categorization: Primary   Priority: unscheduled  Indications for : Fetal Intolerance of Labor       Skin Incision Type: Pfannenstiel  Uterine Incision: Low Transverse        Presentation    Presentation: Vertex       Cord    Vessels: 3 Vessels  Complications: None  Delayed Cord Clamping?: Yes  Cord Blood Disposition: Lab  Gases Sent?: No              Placenta    Date/Time: 2024 15:10:00  Removal: Manual Removal  Appearance: Intact  Disposition: Pathology       Lacerations    Episiotomy: None  Perineal Lacerations: None  Other Lacerations: no non-perineal laceration       Blood Loss  Mother: Chanel Dudley #[id number]     Start of Mother's Information      Delivery Blood Loss   Intrapartum & Postpartum: 24 1200 - 24 1605    Delivery Admission: 24 2040 - 24 1605         Intrapartum & Postpartum Delivery Admission    None                  End of Mother's Information  Mother: Chanel Dudley #[id number]                Delivery Providers    Delivering clinician: Jessica Stevens MD     Provider Role    Jessica Stevens MD Obstetrician    Toshia Harvey RN Primary Nurse     Primary  Nurse

## 2024-11-21 NOTE — PROGRESS NOTES
1900: Bedside and Verbal shift change report given to Humberto RN  (oncoming nurse) by Osmani RN  (offgoing nurse). Report included the following information Nurse Handoff Report, Adult Overview, MAR, Recent Results, Quality Measures, and Event Log.  Care assumed at this time. Alcon VENEGAS at bedside educating pt on transfer procedure to [facility].     1925: This RN giving report to Joan from transport team. ETA 20-25 minutes.     1931: This RN calling to give Report to Agustina Gillespie RN at [facility].     Transport team arrived, Supervisor and Diandra VENEGAS notified.     1952: Transport team in pt room, unhooking pt for monitors and transferring pt to stretcher.    2001: Pt leaving unit with transport team, on stretcher.

## 2024-11-21 NOTE — ANESTHESIA PROCEDURE NOTES
Epidural Block    Patient location during procedure: OB  Reason for block: labor epidural  Staffing  Performed: anesthesiologist   Performed by: Raj Orr MD  Authorized by: Raj Orr MD    Epidural  Patient position: sitting  Prep: DuraPrep  Patient monitoring: continuous pulse ox and frequent blood pressure checks  Approach: midline  Location: L2-3  Injection technique: LIV air and LIV saline  Provider prep: mask and sterile gloves  Needle  Needle type: Tuohy   Needle gauge: 17 G  Needle length: 3.5 in  Needle insertion depth: 7.5 cm  Catheter type: multi-orifice  Catheter size: 20 G  Catheter at skin depth: 12 cm  Test dose: negativeCatheter Secured: tegaderm and tape  Assessment  Sensory level: T10  Hemodynamics: stable  Attempts: 2  Outcomes: uncomplicated and patient tolerated procedure well  Preanesthetic Checklist  Completed: patient identified, IV checked, site marked, risks and benefits discussed, surgical/procedural consents, equipment checked, pre-op evaluation, timeout performed, anesthesia consent given, oxygen available, monitors applied/VS acknowledged, fire risk safety assessment completed and verbalized and blood product R/B/A discussed and consented

## 2024-11-21 NOTE — ANESTHESIA PRE PROCEDURE
Department of Anesthesiology  Preprocedure Note       Name:  Chanel Dudley   Age:  24 y.o.  :  2000                                          MRN:  [id number]         Date:  2024      Surgeon: * No surgeons listed *    Procedure: * No procedures listed *    Medications prior to admission:   Prior to Admission medications    Medication Sig Start Date End Date Taking? Authorizing Provider   Multiple Vitamins-Minerals (THERAPEUTIC MULTIVITAMIN-MINERALS) tablet Take 1 tablet by mouth daily    ProviderLucina MD   aspirin 81 MG chewable tablet Take 1 tablet by mouth daily    ProviderLucina MD   busPIRone (BUSPAR) 7.5 MG tablet Take 1 tablet by mouth 2 times daily  Patient not taking: Reported on 2024 7/3/18   Automatic Reconciliation, Ar   levonorgestrel (MIRENA) IUD 52 mg 1 Device by IntraUTERine route once  Patient not taking: Reported on 2024    Automatic Reconciliation, Ar   minocycline (MINOCIN;DYNACIN) 100 MG capsule TK 1 C PO D  Patient not taking: Reported on 2024   Automatic Reconciliation, Ar       Current medications:    Current Facility-Administered Medications   Medication Dose Route Frequency Provider Last Rate Last Admin   • ePHEDrine 50 MG/ML injection            • lactated ringers infusion   IntraVENous Continuous Luz Elena Casas MD 75 mL/hr at 24 0947 Rate Verify at 24 0947   • sodium chloride flush 0.9 % injection 5-40 mL  5-40 mL IntraVENous 2 times per day Luz Elena Casas MD       • sodium chloride flush 0.9 % injection 5-40 mL  5-40 mL IntraVENous PRN Luz Elena Casas MD       • 0.9 % sodium chloride infusion   IntraVENous PRN Luz Elena Casas MD       • magnesium sulfate (69596 mg/500mL infusion)  1,000 mg/hr IntraVENous Continuous Luz Elena Casas MD 25 mL/hr at 24 0634 1,000 mg/hr at 24 0634   • calcium gluconate 10 % injection 1,000 mg  1,000 mg IntraVENous PRN Luz Elena Casas MD       • betamethasone

## 2024-11-21 NOTE — ANESTHESIA POSTPROCEDURE EVALUATION
Department of Anesthesiology  Postprocedure Note    Patient: Chanel Dudley  MRN: [id number]  YOB: 2000  Date of evaluation: 2024    Procedure Summary       Date: 24 Room / Location: [facility] L&D OR    Anesthesia Start: 1302 Anesthesia Stop: 1605    Procedures:        SECTION (Abdomen/Perineum)      Labor Analgesia Diagnosis: (Fetal Intolerance of Labor)    Surgeons: Jessica Stevens MD Responsible Provider: Raj Orr MD    Anesthesia Type: Epidural ASA Status: 3            Anesthesia Type: Epidural    Paul Phase I: Paul Score: 10    Paul Phase II:      Anesthesia Post Evaluation  Post-Anesthesia Evaluation and Assessment    Patient: Chanel Dudley MRN: [id number]  SSN: [id number]    YOB: 2000  Age: 24 y.o.  Sex: female      I have evaluated the patient and they are stable and ready for discharge from the PACU.     Cardiovascular Function/Vital Signs  Visit Vitals  BP (!) 167/106   Pulse 85   Temp 97.9 °F (36.6 °C) (Oral)   Resp 16   SpO2 100%       Patient is status post Epidural anesthesia for Procedure(s):   SECTION.    Nausea/Vomiting: Mild    Postoperative hydration reviewed and adequate.    Pain:      Managed    Neurological Status:       Block resolving    Mental Status, Level of Consciousness: Arousable    Pulmonary Status:       Adequate oxygenation and airway patent    Complications related to anesthesia: None    Post-anesthesia assessment completed. No concerns    Signed By: Raj Orr MD     2024                No notable events documented.

## 2024-11-21 NOTE — H&P
Department of Obstetrics and Gynecology  Attending Obstetrics History and Physical        CHIEF COMPLAINT:  elevated blood pressure    HISTORY OF PRESENT ILLNESS:      Pt is a 24 y.o.   @ 34w6d, pt of C at [facility] with pre-e with severe features. Transferred here because [facility] NICU is full. Baby also has FGR, EFW 1819g on . Pt feels well. Feels better than when she initially went in.    Other pregnancy concerns -  Anxiety - no meds  Elevated AFP  ADHD no meds        OB History    Para Term  AB Living   2       1     SAB IAB Ectopic Molar Multiple Live Births                    # Outcome Date GA Lbr Jay [place]/2nd Weight Sex Type Anes PTL Lv   2 Current            1 AB                Past Medical History:        Diagnosis Date    Mental disorder      Past Surgical History:    No past surgical history on file.  Social History:    TOBACCO:   reports that she has never smoked. She has never used smokeless tobacco.  ETOH:   reports that she does not currently use alcohol.  DRUGS:   reports no history of drug use.  Family History:   No family history on file.  Medications Prior to Admission:  Medications Prior to Admission: Multiple Vitamins-Minerals (THERAPEUTIC MULTIVITAMIN-MINERALS) tablet, Take 1 tablet by mouth daily  aspirin 81 MG chewable tablet, Take 1 tablet by mouth daily  busPIRone (BUSPAR) 7.5 MG tablet, Take 1 tablet by mouth 2 times daily (Patient not taking: Reported on 2024)  levonorgestrel (MIRENA) IUD 52 mg, 1 Device by IntraUTERine route once (Patient not taking: Reported on 2024)  minocycline (MINOCIN;DYNACIN) 100 MG capsule, TK 1 C PO D (Patient not taking: Reported on 2024)  Allergies:  Latex    REVIEW OF SYSTEMS:    As per HPI, otherwise neg    PHYSICAL EXAM:    General appearance:  awake, alert, cooperative, no apparent distress, and appears stated age  Lungs:  normal respiratory effort  Heart:  regular rate and rhythm  Abdomen:  soft, NT  Fetal heart

## 2024-11-22 LAB
ALBUMIN SERPL-MCNC: 1.7 G/DL (ref 3.5–5)
ALBUMIN/GLOB SERPL: 0.4 (ref 1.1–2.2)
ALP SERPL-CCNC: 180 U/L (ref 45–117)
ALT SERPL-CCNC: 24 U/L (ref 12–78)
ANION GAP SERPL CALC-SCNC: 9 MMOL/L (ref 2–12)
AST SERPL-CCNC: 44 U/L (ref 15–37)
BACTERIA SPEC CULT: ABNORMAL
BILIRUB SERPL-MCNC: <0.1 MG/DL (ref 0.2–1)
BUN SERPL-MCNC: 24 MG/DL (ref 6–20)
BUN/CREAT SERPL: 16 (ref 12–20)
CALCIUM SERPL-MCNC: 7.3 MG/DL (ref 8.5–10.1)
CHLORIDE SERPL-SCNC: 102 MMOL/L (ref 97–108)
CO2 SERPL-SCNC: 21 MMOL/L (ref 21–32)
CREAT SERPL-MCNC: 1.51 MG/DL (ref 0.55–1.02)
ERYTHROCYTE [DISTWIDTH] IN BLOOD BY AUTOMATED COUNT: 15 % (ref 11.5–14.5)
GLOBULIN SER CALC-MCNC: 3.9 G/DL (ref 2–4)
GLUCOSE SERPL-MCNC: 122 MG/DL (ref 65–100)
HCT VFR BLD AUTO: 33.6 % (ref 35–47)
HGB BLD-MCNC: 10.9 G/DL (ref 11.5–16)
LDH SERPL L TO P-CCNC: 555 U/L (ref 81–246)
MCH RBC QN AUTO: 28.6 PG (ref 26–34)
MCHC RBC AUTO-ENTMCNC: 32.4 G/DL (ref 30–36.5)
MCV RBC AUTO: 88.2 FL (ref 80–99)
NRBC # BLD: 0.06 K/UL (ref 0–0.01)
NRBC BLD-RTO: 0.2 PER 100 WBC
PLATELET # BLD AUTO: 201 K/UL (ref 150–400)
PMV BLD AUTO: 12.6 FL (ref 8.9–12.9)
POTASSIUM SERPL-SCNC: 4.7 MMOL/L (ref 3.5–5.1)
PROT SERPL-MCNC: 5.6 G/DL (ref 6.4–8.2)
RBC # BLD AUTO: 3.81 M/UL (ref 3.8–5.2)
SERVICE CMNT-IMP: ABNORMAL
SODIUM SERPL-SCNC: 132 MMOL/L (ref 136–145)
WBC # BLD AUTO: 30.7 K/UL (ref 3.6–11)

## 2024-11-22 PROCEDURE — 6370000000 HC RX 637 (ALT 250 FOR IP): Performed by: OBSTETRICS & GYNECOLOGY

## 2024-11-22 PROCEDURE — 85027 COMPLETE CBC AUTOMATED: CPT

## 2024-11-22 PROCEDURE — 1120000000 HC RM PRIVATE OB

## 2024-11-22 PROCEDURE — 83615 LACTATE (LD) (LDH) ENZYME: CPT

## 2024-11-22 PROCEDURE — 6370000000 HC RX 637 (ALT 250 FOR IP): Performed by: STUDENT IN AN ORGANIZED HEALTH CARE EDUCATION/TRAINING PROGRAM

## 2024-11-22 PROCEDURE — 80053 COMPREHEN METABOLIC PANEL: CPT

## 2024-11-22 PROCEDURE — 36415 COLL VENOUS BLD VENIPUNCTURE: CPT

## 2024-11-22 RX ORDER — CALCIUM GLUCONATE 20 MG/ML
1000 INJECTION, SOLUTION INTRAVENOUS AS NEEDED
Status: DISCONTINUED | OUTPATIENT
Start: 2024-11-22 | End: 2024-11-24 | Stop reason: HOSPADM

## 2024-11-22 RX ORDER — NIFEDIPINE 30 MG/1
60 TABLET, EXTENDED RELEASE ORAL DAILY
Status: DISCONTINUED | OUTPATIENT
Start: 2024-11-23 | End: 2024-11-24

## 2024-11-22 RX ORDER — NIFEDIPINE 30 MG/1
30 TABLET, EXTENDED RELEASE ORAL
Status: COMPLETED | OUTPATIENT
Start: 2024-11-22 | End: 2024-11-22

## 2024-11-22 RX ORDER — DOCUSATE SODIUM 100 MG/1
100 CAPSULE, LIQUID FILLED ORAL 2 TIMES DAILY
Status: DISCONTINUED | OUTPATIENT
Start: 2024-11-22 | End: 2024-11-24 | Stop reason: HOSPADM

## 2024-11-22 RX ADMIN — DOCUSATE SODIUM 100 MG: 100 CAPSULE, LIQUID FILLED ORAL at 09:17

## 2024-11-22 RX ADMIN — ACETAMINOPHEN 1000 MG: 500 TABLET ORAL at 18:06

## 2024-11-22 RX ADMIN — DOCUSATE SODIUM 100 MG: 100 CAPSULE, LIQUID FILLED ORAL at 21:02

## 2024-11-22 RX ADMIN — IBUPROFEN 800 MG: 400 TABLET, FILM COATED ORAL at 18:08

## 2024-11-22 RX ADMIN — NIFEDIPINE 30 MG: 30 TABLET, EXTENDED RELEASE ORAL at 12:46

## 2024-11-22 RX ADMIN — NIFEDIPINE 30 MG: 30 TABLET, EXTENDED RELEASE ORAL at 09:17

## 2024-11-22 ASSESSMENT — PAIN DESCRIPTION - LOCATION
LOCATION: ABDOMEN
LOCATION: ABDOMEN

## 2024-11-22 ASSESSMENT — PAIN DESCRIPTION - ORIENTATION
ORIENTATION: ANTERIOR
ORIENTATION: ANTERIOR

## 2024-11-22 ASSESSMENT — PAIN DESCRIPTION - DESCRIPTORS
DESCRIPTORS: ACHING
DESCRIPTORS: ACHING

## 2024-11-22 ASSESSMENT — PAIN SCALES - GENERAL
PAINLEVEL_OUTOF10: 5
PAINLEVEL_OUTOF10: 3

## 2024-11-22 ASSESSMENT — PAIN - FUNCTIONAL ASSESSMENT
PAIN_FUNCTIONAL_ASSESSMENT: ACTIVITIES ARE NOT PREVENTED
PAIN_FUNCTIONAL_ASSESSMENT: ACTIVITIES ARE NOT PREVENTED

## 2024-11-22 NOTE — CARE COORDINATION
Transition of Care Plan:    RUR: 7%--low  Prior Level of Functioning: independent  Disposition: home  DALI: 11/23  Follow up appointments: OB/GYN  DME needed: N/A  Transportation at discharge: in car w/significant other  Caregiver Contact: Lm Arenas, significant other, [phone]  Discharge Caregiver contacted prior to discharge? N/A  Care Conference needed? no  Barriers to discharge:  none noted       11/22/24 0907   Readmission Assessment   Number of Days since last admission? 1-7 days  (transfer from [facility])   Previous Disposition Other (comment)  (transfer)   Who is being Interviewed Patient  (medical record)   What was the patient's/caregiver's perception as to why they think they needed to return back to the hospital? Other (Comment)  (transfer)   Did you visit your Primary Care Physician after you left the hospital, before you returned this time? No   Why weren't you able to visit your PCP? Did not have an appointment   Did you see a specialist, such as Cardiac, Pulmonary, Orthopedic Physician, etc. after you left the hospital? Other (Comment)  (transfer)   Who advised the patient to return to the hospital? Physician   Does the patient report anything that got in the way of taking their medications? No   In our efforts to provide the best possible care to you and others like you, can you think of anything that we could have done to help you after you left the hospital the first time, so that you might not have needed to return so soon? Other (Comment)  (pregnancy concerns)     Maria Del Carmen Mcmahan LMSW  Care Management [facility]  [phone]

## 2024-11-22 NOTE — LACTATION NOTE
Initial Lactation Consultation -  patient delivered by  yesterday at 34 weeks gestation. Patient transferred from [place].      Infant admitted to NICU.  Pt will successfully establish breast milk supply by pumping with a hospital grade pump every 2-3 hours for approximately 20 minutes/8-10 x day with the correct size flange, and suction level for mother's comfort.  The breast will be offered as baby is ready; with the goal of eventual transition to breastfeeding.

## 2024-11-22 NOTE — CARE COORDINATION
Care Management Initial Assessment       RUR: 7%--low  Readmission? Yes - transfer from [facility]  1st IM letter given? No  1st  letter given: No    Emergency contact: Lm Arenas, significant other, [phone]    Patient transferred from [facility] where she was admitted for nausea and vomiting in pregnancy due to pre-eclampsia. Patient 34w6d GA and baby needed NICU admission. [facility] NICU on diversion at time of transfer. Infant delivered via . CM will follow baby in NICU.       [id number]   Service Assessment   Patient Orientation Alert and Oriented   Cognition Alert   History Provided By Medical Record   Primary Caregiver Self   Support Systems Spouse/Significant Other   PCP Verified by CM Yes  (Dr. Win Dunn)   Prior Functional Level Independent in ADLs/IADLs   Current Functional Level Independent in ADLs/IADLs   Can patient return to prior living arrangement Yes   Ability to make needs known: Good   Financial Resources Medicaid   Community Resources None   Social/Functional History   Lives With Significant other   Type of Home Apartment   Home Equipment None   ADL Assistance Independent   Homemaking Assistance Independent   Ambulation Assistance Independent   Transfer Assistance Independent   Occupation Unemployed   Discharge Planning   Type of Residence Apartment   Living Arrangements Spouse/Significant Other   Current Services Prior To Admission None   Potential Assistance Needed N/A   DME Ordered? No   Potential Assistance Purchasing Medications No   Type of Home Care Services None   Patient expects to be discharged to: Apartment   Services At/After Discharge   Transition of Care Consult (CM Consult) N/A   Services At/After Discharge None   Mode of Transport at Discharge Other (see comment)  (in car w/spouse)   Condition of Participation: Discharge Planning   The Plan for Transition of Care is related to the following treatment goals: home     Maria Del Carmen Mcmahan LMSW  Care Management [place]

## 2024-11-23 LAB
ALBUMIN SERPL-MCNC: 1.7 G/DL (ref 3.5–5)
ALBUMIN/GLOB SERPL: 0.4 (ref 1.1–2.2)
ALP SERPL-CCNC: 153 U/L (ref 45–117)
ALT SERPL-CCNC: 25 U/L (ref 12–78)
ANION GAP SERPL CALC-SCNC: 4 MMOL/L (ref 2–12)
AST SERPL-CCNC: 76 U/L (ref 15–37)
BILIRUB SERPL-MCNC: 0.2 MG/DL (ref 0.2–1)
BUN SERPL-MCNC: 25 MG/DL (ref 6–20)
BUN/CREAT SERPL: 19 (ref 12–20)
CALCIUM SERPL-MCNC: 7.5 MG/DL (ref 8.5–10.1)
CHLORIDE SERPL-SCNC: 107 MMOL/L (ref 97–108)
CO2 SERPL-SCNC: 24 MMOL/L (ref 21–32)
CREAT SERPL-MCNC: 1.34 MG/DL (ref 0.55–1.02)
GLOBULIN SER CALC-MCNC: 3.9 G/DL (ref 2–4)
GLUCOSE SERPL-MCNC: 76 MG/DL (ref 65–100)
POTASSIUM SERPL-SCNC: 4.4 MMOL/L (ref 3.5–5.1)
PROT SERPL-MCNC: 5.6 G/DL (ref 6.4–8.2)
SODIUM SERPL-SCNC: 135 MMOL/L (ref 136–145)

## 2024-11-23 PROCEDURE — 6370000000 HC RX 637 (ALT 250 FOR IP): Performed by: OBSTETRICS & GYNECOLOGY

## 2024-11-23 PROCEDURE — 1120000000 HC RM PRIVATE OB

## 2024-11-23 PROCEDURE — 6370000000 HC RX 637 (ALT 250 FOR IP): Performed by: STUDENT IN AN ORGANIZED HEALTH CARE EDUCATION/TRAINING PROGRAM

## 2024-11-23 PROCEDURE — 36415 COLL VENOUS BLD VENIPUNCTURE: CPT

## 2024-11-23 PROCEDURE — 80053 COMPREHEN METABOLIC PANEL: CPT

## 2024-11-23 RX ADMIN — IBUPROFEN 800 MG: 400 TABLET, FILM COATED ORAL at 08:32

## 2024-11-23 RX ADMIN — DOCUSATE SODIUM 100 MG: 100 CAPSULE, LIQUID FILLED ORAL at 20:50

## 2024-11-23 RX ADMIN — NIFEDIPINE 60 MG: 30 TABLET, EXTENDED RELEASE ORAL at 08:32

## 2024-11-23 RX ADMIN — IBUPROFEN 800 MG: 400 TABLET, FILM COATED ORAL at 02:04

## 2024-11-23 RX ADMIN — ACETAMINOPHEN 1000 MG: 500 TABLET ORAL at 22:06

## 2024-11-23 RX ADMIN — DOCUSATE SODIUM 100 MG: 100 CAPSULE, LIQUID FILLED ORAL at 08:32

## 2024-11-23 RX ADMIN — OXYCODONE 10 MG: 5 TABLET ORAL at 20:50

## 2024-11-23 RX ADMIN — ACETAMINOPHEN 1000 MG: 500 TABLET ORAL at 13:52

## 2024-11-23 RX ADMIN — ACETAMINOPHEN 1000 MG: 500 TABLET ORAL at 02:03

## 2024-11-23 ASSESSMENT — PAIN SCALES - GENERAL
PAINLEVEL_OUTOF10: 5
PAINLEVEL_OUTOF10: 1
PAINLEVEL_OUTOF10: 3
PAINLEVEL_OUTOF10: 3
PAINLEVEL_OUTOF10: 4

## 2024-11-23 ASSESSMENT — PAIN DESCRIPTION - ORIENTATION
ORIENTATION: LOWER
ORIENTATION: LOWER
ORIENTATION: LOWER;ANTERIOR
ORIENTATION: LOWER
ORIENTATION: ANTERIOR;LOWER

## 2024-11-23 ASSESSMENT — PAIN DESCRIPTION - DESCRIPTORS
DESCRIPTORS: SORE
DESCRIPTORS: ACHING
DESCRIPTORS: ACHING
DESCRIPTORS: SORE
DESCRIPTORS: ACHING

## 2024-11-23 ASSESSMENT — PAIN - FUNCTIONAL ASSESSMENT: PAIN_FUNCTIONAL_ASSESSMENT: ACTIVITIES ARE NOT PREVENTED

## 2024-11-23 ASSESSMENT — PAIN DESCRIPTION - LOCATION
LOCATION: ABDOMEN

## 2024-11-24 VITALS
HEART RATE: 86 BPM | SYSTOLIC BLOOD PRESSURE: 150 MMHG | OXYGEN SATURATION: 100 % | RESPIRATION RATE: 16 BRPM | DIASTOLIC BLOOD PRESSURE: 96 MMHG | TEMPERATURE: 98 F

## 2024-11-24 LAB
ALBUMIN SERPL-MCNC: 1.6 G/DL (ref 3.5–5)
ALBUMIN/GLOB SERPL: 0.4 (ref 1.1–2.2)
ALP SERPL-CCNC: 136 U/L (ref 45–117)
ALT SERPL-CCNC: 26 U/L (ref 12–78)
ANION GAP SERPL CALC-SCNC: 6 MMOL/L (ref 2–12)
AST SERPL-CCNC: 58 U/L (ref 15–37)
BASOPHILS # BLD: 0 K/UL (ref 0–0.1)
BASOPHILS NFR BLD: 0 % (ref 0–1)
BILIRUB SERPL-MCNC: 0.2 MG/DL (ref 0.2–1)
BUN SERPL-MCNC: 17 MG/DL (ref 6–20)
BUN/CREAT SERPL: 16 (ref 12–20)
CALCIUM SERPL-MCNC: 8 MG/DL (ref 8.5–10.1)
CHLORIDE SERPL-SCNC: 106 MMOL/L (ref 97–108)
CO2 SERPL-SCNC: 26 MMOL/L (ref 21–32)
CREAT SERPL-MCNC: 1.07 MG/DL (ref 0.55–1.02)
DIFFERENTIAL METHOD BLD: ABNORMAL
EOSINOPHIL # BLD: 0.1 K/UL (ref 0–0.4)
EOSINOPHIL NFR BLD: 0 % (ref 0–7)
ERYTHROCYTE [DISTWIDTH] IN BLOOD BY AUTOMATED COUNT: 15.5 % (ref 11.5–14.5)
GLOBULIN SER CALC-MCNC: 3.7 G/DL (ref 2–4)
GLUCOSE SERPL-MCNC: 77 MG/DL (ref 65–100)
HCT VFR BLD AUTO: 34 % (ref 35–47)
HGB BLD-MCNC: 11.1 G/DL (ref 11.5–16)
IMM GRANULOCYTES # BLD AUTO: 0.1 K/UL (ref 0–0.04)
IMM GRANULOCYTES NFR BLD AUTO: 0 % (ref 0–0.5)
LYMPHOCYTES # BLD: 2 K/UL (ref 0.8–3.5)
LYMPHOCYTES NFR BLD: 12 % (ref 12–49)
MCH RBC QN AUTO: 28.5 PG (ref 26–34)
MCHC RBC AUTO-ENTMCNC: 32.6 G/DL (ref 30–36.5)
MCV RBC AUTO: 87.4 FL (ref 80–99)
MONOCYTES # BLD: 0.8 K/UL (ref 0–1)
MONOCYTES NFR BLD: 5 % (ref 5–13)
NEUTS SEG # BLD: 13 K/UL (ref 1.8–8)
NEUTS SEG NFR BLD: 83 % (ref 32–75)
NRBC # BLD: 0.03 K/UL (ref 0–0.01)
NRBC BLD-RTO: 0.2 PER 100 WBC
PLATELET # BLD AUTO: 149 K/UL (ref 150–400)
PMV BLD AUTO: 11.9 FL (ref 8.9–12.9)
POTASSIUM SERPL-SCNC: 3.9 MMOL/L (ref 3.5–5.1)
PROT SERPL-MCNC: 5.3 G/DL (ref 6.4–8.2)
RBC # BLD AUTO: 3.89 M/UL (ref 3.8–5.2)
SODIUM SERPL-SCNC: 138 MMOL/L (ref 136–145)
WBC # BLD AUTO: 15.9 K/UL (ref 3.6–11)

## 2024-11-24 PROCEDURE — 6370000000 HC RX 637 (ALT 250 FOR IP): Performed by: OBSTETRICS & GYNECOLOGY

## 2024-11-24 PROCEDURE — 36415 COLL VENOUS BLD VENIPUNCTURE: CPT

## 2024-11-24 PROCEDURE — 85025 COMPLETE CBC W/AUTO DIFF WBC: CPT

## 2024-11-24 PROCEDURE — 6370000000 HC RX 637 (ALT 250 FOR IP): Performed by: STUDENT IN AN ORGANIZED HEALTH CARE EDUCATION/TRAINING PROGRAM

## 2024-11-24 PROCEDURE — 59025 FETAL NON-STRESS TEST: CPT

## 2024-11-24 PROCEDURE — 80053 COMPREHEN METABOLIC PANEL: CPT

## 2024-11-24 RX ORDER — IBUPROFEN 800 MG/1
800 TABLET, FILM COATED ORAL EVERY 8 HOURS PRN
Qty: 30 TABLET | Refills: 1 | Status: SHIPPED | OUTPATIENT
Start: 2024-11-24

## 2024-11-24 RX ORDER — OXYCODONE HYDROCHLORIDE 5 MG/1
5 TABLET ORAL EVERY 6 HOURS PRN
Qty: 12 TABLET | Refills: 0 | Status: SHIPPED | OUTPATIENT
Start: 2024-11-24 | End: 2024-11-27

## 2024-11-24 RX ORDER — NIFEDIPINE 30 MG/1
90 TABLET, EXTENDED RELEASE ORAL DAILY
Status: DISCONTINUED | OUTPATIENT
Start: 2024-11-24 | End: 2024-11-24 | Stop reason: HOSPADM

## 2024-11-24 RX ORDER — NIFEDIPINE 30 MG/1
90 TABLET, EXTENDED RELEASE ORAL DAILY
Qty: 90 TABLET | Refills: 1 | Status: SHIPPED | OUTPATIENT
Start: 2024-11-24

## 2024-11-24 RX ADMIN — NIFEDIPINE 90 MG: 30 TABLET, EXTENDED RELEASE ORAL at 09:30

## 2024-11-24 RX ADMIN — ACETAMINOPHEN 1000 MG: 500 TABLET ORAL at 06:35

## 2024-11-24 RX ADMIN — DOCUSATE SODIUM 100 MG: 100 CAPSULE, LIQUID FILLED ORAL at 09:41

## 2024-11-24 RX ADMIN — ACETAMINOPHEN 1000 MG: 500 TABLET ORAL at 14:38

## 2024-11-24 ASSESSMENT — PAIN DESCRIPTION - LOCATION
LOCATION: ABDOMEN
LOCATION: ABDOMEN

## 2024-11-24 ASSESSMENT — PAIN DESCRIPTION - DESCRIPTORS
DESCRIPTORS: CRAMPING
DESCRIPTORS: ACHING

## 2024-11-24 ASSESSMENT — PAIN SCALES - GENERAL
PAINLEVEL_OUTOF10: 3
PAINLEVEL_OUTOF10: 3

## 2024-11-24 ASSESSMENT — PAIN DESCRIPTION - ORIENTATION: ORIENTATION: LOWER

## 2024-11-24 NOTE — PROGRESS NOTES
Post-Operative  Day 3    Chanel Dudley       Assessment: Post-Op day 3, doing well    Plan:   - Routine post-operative care.  - Pre- eclampsia w/SF --> s/p Mg PP.  Currently on Procardia 60 XL but multiple Bps in the 150s --> plan to uptitrate to 90 mg daily  - Elevated Cr.  Resolving.  AM 1.07  - AST up a little -- will recheck again in AM --> Decreasing  - Anxiety -- continue to monitor, no meds.  - Postop hemoglobin stable (suspect hemoconcentration however).  AM Hb 11.1  - Bandage changed yesterday -- no issues.  - Baby stable in NICU  - Ambulate today.  - Plan for tentative DC this afternoon if Bps good.   - Pain medication prescription(s) sent.  - Questions answered.  - Office follow up in 1 week.       Information for the patient's :  Octavia, RY Buchanan [[id number]]   , Low Transverse Patient doing well without significant complaint. Tolerating regular diet.  Ambulating.  Voiding without difficulty.  No incision issues.  Some discomforts with position changes.     Vitals:  BP (!) 157/102   Pulse 75   Temp 98.8 °F (37.1 °C) (Oral)   Resp 18   SpO2 100%   Breastfeeding Unknown   Temp (24hrs), Av °F (37.2 °C), Min:98.8 °F (37.1 °C), Max:99.1 °F (37.3 °C)        Exam:       Patient without distress.                 Fundus firm, nontender per nursing fundal checks.     Incision bandaged. Clean, dry, intact.                Perineum with normal lochia noted per nursing assessment.                Lower extremities are negative for pathological edema.    Labs:   Lab Results   Component Value Date/Time    WBC 15.9 2024 05:45 AM    WBC 30.7 2024 06:31 AM    WBC 26.2 2024 06:09 PM    WBC 19.7 2024 11:10 AM    WBC 17.2 2024 04:47 AM    WBC 16.1 2024 10:07 PM    WBC 12.3 2024 03:55 PM    HGB 11.1 2024 05:45 AM    HGB 10.9 2024 06:31 AM    HGB 12.0 2024 06:09 PM    HGB 13.5 2024 11:10 AM    HGB 12.0 
0730: Bedside and Verbal shift change report given to RANJAN Kent RN (oncoming nurse) by LJ Huntley RN (offgoing nurse). Report included the following information Nurse Handoff Report, Adult Overview, Intake/Output, MAR, Recent Results, Med Rec Status, and Event Log.    0751: Dr. Stevens at bedside. Plan to reassess CRB at 12hr and make POC from there.  0759: Patient up to bedside commode. RN at bedside   0957: Dr. Stevens at bedside.  0958: CRB removed. SVE 4/50/-2. Plan to keep pitocin at 4 for another hour, and then will continue to go up slowly. Will start labetalol protocol for elevated BP. Waiting on orders.   1023: BP WNL, no orders for labetalol. Dr. Stevens aware of BP, will retake in 30 min and if normal will hold off on labetalol for now.   1130: Bedside and Verbal shift change report given to JOSELYN Harvey RN (oncoming nurse) by RANJAN Kent RN (offgoing nurse). Report included the following information Nurse Handoff Report, Adult Overview, Intake/Output, MAR, Recent Results, Med Rec Status, and Event Log.    
1130- Bedside and Verbal shift change report given to JOSELYN Harvey RN (oncoming nurse) by RANJAN Kent RN (offgoing nurse). Report included the following information Nurse Handoff Report, Intake/Output, MAR, and Recent Results.     1216- Dr Stevens notified of elevated BP, labetalol IV orders received.    1240- Dr Stevens at bedside reviewing strip, discussing POC including pain medication. Patient asking for epidural at this time    1257- Dr Orr at bedside for epidural     1400- Dr Veloz at bedside discussing POC including  due to FHR tracing     1403- c-section called, patient agrees with no questions     1447- Bedside and Verbal shift change report given to RANJAN Kent RN (oncoming nurse) by JOSELYN Harvey RN (offgoing nurse). Report included the following information Nurse Handoff Report, Intake/Output, MAR, and Recent Results.     
1447: Bedside and Verbal shift change report given to RANJAN Kent RN (oncoming nurse) by JOSELYN Harvey RN (offgoing nurse). Report included the following information Nurse Handoff Report, Adult Overview, Intake/Output, MAR, Recent Results, Med Rec Status, and Event Log.    1559: Patient out of OR 1.   1601: Patient back in LD 2 for recovery.   1717: Second severe BP in 15 min. IV labetalol protocol started.   1721: 20mg labetalol given. See MAR. Will take BP in 10 min.  1921: Patient ready to be transferred out to MIU. BP went off, severe range. Will re-check in 15 min   1936: Repeat severe. New orders from Dr. Bergeron received to restart labetalol protocol   2005: Bedside and Verbal shift change report given to DARYA Desouza RN (oncoming nurse) by RANJAN Kent RN (offgoing nurse). Report included the following information Nurse Handoff Report, Adult Overview, Surgery Report, Intake/Output, MAR, Recent Results, Med Rec Status, and Event Log.    
2000-Bedside and Verbal shift change report given to GIULIA Desouza RN  (oncoming nurse) by GIULIA Kent RN (offgoing nurse). Report included the following information Nurse Handoff Report, Index, Intake/Output, MAR, Recent Results, and Event Log.  Client repeat BP within limits will continue tp monitor.   2020-Dr. Bergeron informed of current BP   2215-Client transferred to [facility]  2225-TRANSFER - OUT REPORT:    Verbal report given to GERMAN Marte RN  RN on Chanel Dudley  being transferred to [facility] for routine progression of patient care       Report consisted of patient's Situation, Background, Assessment and   Recommendations(SBAR).     Information from the following report(s) Nurse Handoff Report, Index, Intake/Output, MAR, Recent Results, Med Rec Status, and Event Log was reviewed with the receiving nurse.           Lines:   Peripheral IV 11/20/24 Left Antecubital (Active)   Site Assessment Clean, dry & intact 11/20/24 1940   Line Status Infusing 11/20/24 1940   Line Care Connections checked and tightened 11/20/24 1940   Phlebitis Assessment No symptoms 11/20/24 1940   Infiltration Assessment 0 11/20/24 1940   Alcohol Cap Used Yes 11/20/24 1940   Dressing Status Clean, dry & intact 11/20/24 1940   Dressing Type Transparent 11/20/24 1940   Dressing Intervention New 11/20/24 1135        Opportunity for questions and clarification was provided.      Patient transported with:  Registered Nurse         
2040 Pt arrived in stable condition to L&D room 2 via Maternal Fetal Transport. Pt up to bathroom with RN assistance before transferring to bed.  
2115 - Bedside and Verbal shift change report given to JACOB Huntley RN (oncoming nurse) by ALVARO Ruiz RN (offgoing nurse). Report included the following information Nurse Handoff Report, Intake/Output, MAR, and Recent Results.    2125 - Dr. Casas at bedside, SVE 1/50/-2.     2130 - Cervical ripening balloon placed, 60/60.     0400 - Rn at bedside, pt up to use bedside commode.     0636 - Pt laying on back, repositioned to left lateral.     0714 - RN to bedside to reposition pt.     0718 - Pt repositioned to right lateral with bump under left hip.     0730 - Bedside and Verbal shift change report given to GIULIA Kent RN (oncoming nurse) by JACOB Huntley RN (offgoing nurse). Report included the following information Nurse Handoff Report, Intake/Output, MAR, and Recent Results.            
Bedside and Verbal shift change report given to  Randee Marr RN (oncoming nurse) by Kathrine Marte RN (offgoing nurse). Report included the following information Nurse Handoff Report, Surgery Report, Intake/Output, MAR, and Recent Results.    @1243 Dr Roy notified of pt's trends in BP with orders.  @1420 Dr Roy seen pt this pm.updated with BP trends.  
Bedside and Verbal shift change report given to Linn RN  (oncoming nurse) by Sarah RN  (offgoing nurse). Report included the following information Nurse Handoff Report, Intake/Output, MAR, Recent Results, and Med Rec Status.       1150- Pt had been resting and in and out of sleep, trying to catch up on rest. Pt working on talking with significant other to plan for trave; today and then a plan for discharge.    1315- Pt eating lunch. Pt expressed that she would be more ready for discharge in about an hour and her significant other would be arriving around then as well.    1445- I have reviewed the provider's instructions with the patient, answering all questions to her satisfaction. Discharge instructions reviewed with pt agreeable. Pt to await for family member and then they will go to NICU to see baby. From there we will have pt leave for discharge when appropriate and ensuring pt understanding. Educated on signs and symptoms of preE and things to look out for as well as incisional care.     
Bedside and Verbal shift change report given to Norma. JAYA RN (oncoming nurse) by Bhavya MONCADA RN (offgoing nurse). Report included the following information Nurse Handoff Report, Index, Intake/Output, MAR, and Recent Results.      0900: Patient's  dressing was saturated. Dressing was changed and replaced with abd pad and tape. Dr. Dykes made aware. MD will round on patient.   
Delayed entry d/t patient care   Labor Progress Note    S: Patient resting comfortably     O:   Vitals:    11/21/24 1023   BP: (!) 135/92   Pulse: 85   Resp: 16   Temp:    SpO2: 98%      FHT: 140 / mod / + accels / no decels     [place]: q 2-4 minutes      SVE: 4/50/-2, balloon deflated and removed      PreE w/SF   - S/p labetalol 20,40 and nifedipine 10,20 at [place] prior to transfer. No acute management since arriving here.   - Procardia 30mg XL daily   - Admission labs notable for Cr 1.45, AST 42, Plt 194, P/C 16 >> repeat labs Cr 1.49, AST 49, plt 219   - UOP currently adequate, voiding in hat. Reviewed if concern for oliguria would recommend indwelling catheter.   - Continue mag at 1g/hour   - Rpt labs pending; if stable, can space to q12 hrs   - NICU consult pending      IOL   - Cook removed, cont pit per protocol and then will assess for AROM next check  - Pain mgmt per patient request   - GBS collected 11/20 but still pending - PCN started      Jessica Stevens MD      
Delayed entry due to acuity on the floor     Patient initially assessed just prior to epidural placement due to fetal heart tracing with late decelerations for several contractions in a row. Pitocin was discontinued and epidural was placed by anesthesia.     After epidural placement fetal heart rate tracing reassessed and noted to still have late decelerations with multiple contractions in a row. Variability at this point also appeared minimal. Cervical exam was 5/80/-2.     Reviewed with patient that with fetal heart tracing with recurrent late decelerations with some response to external resuscitation, but now with worsening variability and remote from delivery in setting of known severe growth restriction, recommend proceeding with  delivery. Patient and partner in agreement with plan.     Anesthesia and NICU notified   Ancef ordered     In room to review plan for .  Reviewed risks including but not limited to anesthesia, infection, bleeding (possibly requiring blood transfusion), and damage to nearby organs, including but not limited to bowel and bladder.  All patient question answered and she desires to proceed with .  Consent signed.      Jessica Stevens MD    
Labor Progress Note    S: Patient resting, feeling some intermittent pressure.      O:   Vitals:    24 0734   BP: 131/79   Pulse: 74   Resp: 16   Temp: 98.6 °F (37 °C)   SpO2: 98%      FHT: 145 / moderate / + accels / no decels     [place]: q 4-6 minutes      SVE: deferred     23 yo  at 35w0d admitted for IOL for preE with SF. Pregnancy complicated by severe fetal growth restriction (EFW 1.6%), positive AFP (normal detailed anatomical ultrasound).     PreE w/SF   - S/p labetalol 20,40 and nifedipine 10,20 at [place] prior to transfer. No acute management since arriving here.   - Procardia 30mg XL daily   - Admission labs notable for Cr 1.45, AST 42, Plt 194, P/C 16 >> repeat labs Cr 1.49, AST 49, plt 219   - UOP currently adequate, voiding in hat. Reviewed if concern for oliguria would recommend indwelling catheter.   - Continue mag at 1g/hour   - Will check next set of labs at 1030; if stable, can space to q12 hrs   - NICU consult pending     IOL   - Cook 60/60 in place with low dose pitocin overnight   - Will reassess balloon at 0930   - Pain mgmt per patient request   - GBS collected  but still pending - given  will start PCN after cook removal    Jessica Stevens MD      
Post-Operative  Day 1    Chanel Dudley     Assessment: Post-Op day 1, stable    Plan:     - Routine post-operative care.  - Pre- eclampsia w/SF --> continue Mg until 24hrs PP.  Currently on Procardia 60 XL.  Bps in upper mild range, titrate up as needed.    - Elevated Cr.  Suspect related to pre-eclampsia.  Stable ~1.5.  Recheck in AM -- hoping for downward trend.  No signs of HELLP.    - Anxiety -- continue to monitor, no meds.  - Postop hemoglobin stable (suspect hemoconcentration however).  Plan to start Fe at discharge if pt anemic.  - Baby stable in NICU  - Ambulate today.      Information for the patient's :  Octavia, GIRL Chanel [[id number]]   , Low Transverse  Patient doing well without significant complaint.  Tolerating diet. [place] still in.  Has not ambulated yet.      Vitals:  BP (!) 156/100 Comment: Dr Roy notified with orders  Pulse 76   Temp 98.3 °F (36.8 °C) (Oral)   Resp 18   SpO2 95%   Breastfeeding Unknown   Temp (24hrs), Av.2 °F (36.8 °C), Min:97.9 °F (36.6 °C), Max:98.8 °F (37.1 °C)      Last 24hr Input/Output:    Intake/Output Summary (Last 24 hours) at 2024 1422  Last data filed at 2024 1101  Gross per 24 hour   Intake 2857.49 ml   Output 3200 ml   Net -342.51 ml          Exam:     Patient without distress.               Fundus firm, nontender per nursing fundal checks.  Incision bandaged, clean, dry, intact.              Perineum with normal lochia noted per nursing assessment.              Lower extremities are negative for pathological edema.    Labs:   Lab Results   Component Value Date/Time    WBC 30.7 2024 06:31 AM    WBC 26.2 2024 06:09 PM    WBC 19.7 2024 11:10 AM    WBC 17.2 2024 04:47 AM    WBC 16.1 2024 10:07 PM    WBC 12.3 2024 03:55 PM    HGB 10.9 2024 06:31 AM    HGB 12.0 2024 06:09 PM    HGB 13.5 2024 11:10 AM    HGB 12.0 2024 04:47 AM    HGB 12.3 2024 
Post-Operative  Day 2    Chanel Dudley     Assessment: Post-Op day 2, doing well    Plan:   - Routine post-operative care.  - Pre- eclampsia w/SF --> continue Mg until 24hrs PP.  Currently on Procardia 60 XL.  Bps better controlled on current regimen of Procardia 60 XL -- tentatively plan to continue this dose on DC.    - Elevated Cr.  Suspect related to pre-eclampsia.  Stable ~1.5.  Recheck in AM improved.  No signs of HELLP.    - AST up a little -- will recheck again in AM.    - Anxiety -- continue to monitor, no meds.  - Postop hemoglobin stable (suspect hemoconcentration however).  Plan to start Fe at discharge if pt anemic.  - Bandage changed this AM -- looks good on exam -- continue to monitor, incision intact.    - Baby stable in NICU  - Ambulate today.    Information for the patient's :  Octavia, GIRL Chanel [[id number]]   , Low Transverse  Patient doing well without significant complaint. Tolerating regular diet.  Ambulating.  Voiding without difficulty.    Vitals:  BP (!) 150/93   Pulse 78   Temp 99 °F (37.2 °C)   Resp 16   SpO2 98%   Breastfeeding Unknown   Temp (24hrs), Av.4 °F (36.9 °C), Min:98 °F (36.7 °C), Max:99 °F (37.2 °C)        Exam:       Patient without distress.                 Fundus firm, nontender per nursing fundal checks.     Incision bandaged. Clean, dry, intact.                Perineum with normal lochia noted per nursing assessment.                Lower extremities are negative for pathological edema.    Labs:   Lab Results   Component Value Date/Time    WBC 30.7 2024 06:31 AM    WBC 26.2 2024 06:09 PM    WBC 19.7 2024 11:10 AM    WBC 17.2 2024 04:47 AM    WBC 16.1 2024 10:07 PM    WBC 12.3 2024 03:55 PM    HGB 10.9 2024 06:31 AM    HGB 12.0 2024 06:09 PM    HGB 13.5 2024 11:10 AM    HGB 12.0 2024 04:47 AM    HGB 12.3 2024 10:07 PM    HGB 11.1 2024 03:55 PM    HCT 33.6 
TRANSFER - IN REPORT:    Verbal report received from Julieth Desouza RN on Chanel Dudley  being received from L&D for routine progression of patient care      Report consisted of patient's Situation, Background, Assessment and   Recommendations(SBAR).     Information from the following report(s) Nurse Handoff Report, Index, Intake/Output, MAR, and Recent Results was reviewed with the receiving nurse.    Opportunity for questions and clarification was provided.      Assessment completed upon patient's arrival to unit and care assumed.      [id number]: RAYMOND to NICU with this RN   
Update Note:  Called L&D regarding conult requested by Dr. Casas. Mother is an IOL due to IUGR.  Per nursing mother is sleeping.  Advised will attempt consult in am.  Arabella Ramon MSN, NNP-BC  11/20/2024  0125  
Spiritual Care available upon further referral  Family/Friends Plan of Care: Spiritual Care available upon further referral    Electronically signed by RAQUEL Connor on 11/22/2024 at 1:01 PM

## 2024-11-24 NOTE — DISCHARGE INSTRUCTIONS
Learning About Preeclampsia After Childbirth  What is preeclampsia?     Preeclampsia is high blood pressure and signs of organ damage, such as protein in the urine, usually after 20 weeks of pregnancy. If it's not treated, preeclampsia can harm you or your baby.  Severe preeclampsia can lead to dangerous seizures (eclampsia). When preeclampsia affects the liver, it can cause HELLP syndrome, a blood-clotting and bleeding problem. HELLP can come on quickly and can be dangerous. This is why your doctor checks you and your baby often.  Preeclampsia usually goes away after the baby is born. But symptoms may last or get worse after delivery. In rare cases, symptoms may not show up until days or even weeks after childbirth.  What are the symptoms?  Mild preeclampsia usually doesn't cause symptoms. But it may cause rapid weight gain and sudden swelling of the hands and face. Severe preeclampsia can cause symptoms such as a severe headache, vision problems, and trouble breathing. It also can cause belly pain. And you may urinate less than usual.  What can you expect after you've had preeclampsia?  In the hospital  After the baby and the placenta are delivered, preeclampsia usually starts to get better. Most people get better in the first few days after childbirth.  After having preeclampsia, you still have a risk of seizures for a day or more after childbirth. (In very rare cases, seizures happen later on.) So your doctor may have you take magnesium sulfate for a day or more to prevent seizures. You may also take medicine to lower your blood pressure.  When you go home  Your blood pressure will most likely return to normal a few days after delivery. Your doctor will want to check your blood pressure sometime in the first week after you leave the hospital.  High blood pressure sometimes continues after childbirth. But it usually returns to normal levels with time.  Take and record your blood pressure at home if your

## 2024-11-24 NOTE — DISCHARGE SUMMARY
Obstetrical Discharge Summary     Name: Chanel Dudley MRN: [id number]  SSN: [id number]    YOB: 2000  Age: 24 y.o.  Sex: female      Admit Date: 2024    Discharge Date: 2024     Admitting Physician: Luz Elena Casas MD     Attending Physician:  Renae Garza MD     Admission Diagnoses: 34 weeks gestation of pregnancy [Z3A.34]    Discharge Diagnoses:   Information for the patient's :  RY Dudley [[id number]]   @[id number]@     Additional Diagnoses:  No components found for: \"OBEXTABORH\", \"OBEXTABSCRN\", \"OBEXTRUBELLA\", \"OBEXTGRBS\"    Hospital Course: Normal hospital course following the delivery.  Mg for 24 hrs PP.  Bps managed with Procardia.  Stable for DC POD#3.    Patient Instructions:   Current Discharge Medication List        START taking these medications    Details   oxyCODONE (ROXICODONE) 5 MG immediate release tablet Take 1 tablet by mouth every 6 hours as needed for Pain for up to 3 days. Max Daily Amount: 20 mg  Qty: 12 tablet, Refills: 0    Comments: Reduce doses taken as pain becomes manageable  Associated Diagnoses: 34 weeks gestation of pregnancy      ibuprofen (ADVIL;MOTRIN) 800 MG tablet Take 1 tablet by mouth every 8 hours as needed for Pain  Qty: 30 tablet, Refills: 1      NIFEdipine (PROCARDIA XL) 30 MG extended release tablet Take 3 tablets by mouth daily  Qty: 90 tablet, Refills: 1           CONTINUE these medications which have NOT CHANGED    Details   Multiple Vitamins-Minerals (THERAPEUTIC MULTIVITAMIN-MINERALS) tablet Take 1 tablet by mouth daily           STOP taking these medications       aspirin 81 MG chewable tablet Comments:   Reason for Stopping:         busPIRone (BUSPAR) 7.5 MG tablet Comments:   Reason for Stopping:         levonorgestrel (MIRENA) IUD 52 mg Comments:   Reason for Stopping:         minocycline (MINOCIN;DYNACIN) 100 MG capsule Comments:   Reason for Stopping:               Disposition at Discharge: Home or self

## 2024-12-07 ENCOUNTER — HOSPITAL ENCOUNTER (EMERGENCY)
Facility: HOSPITAL | Age: 24
Discharge: STILL ADMITTED OR ADMINISTRATIVE | DRG: 561 | End: 2024-12-07
Payer: MEDICAID

## 2024-12-07 ENCOUNTER — HOSPITAL ENCOUNTER (INPATIENT)
Facility: HOSPITAL | Age: 24
LOS: 1 days | Discharge: HOME | DRG: 561 | End: 2024-12-08
Attending: STUDENT IN AN ORGANIZED HEALTH CARE EDUCATION/TRAINING PROGRAM | Admitting: OBSTETRICS & GYNECOLOGY
Payer: MEDICAID

## 2024-12-07 VITALS
OXYGEN SATURATION: 97 % | HEIGHT: 65 IN | RESPIRATION RATE: 18 BRPM | TEMPERATURE: 98.1 F | SYSTOLIC BLOOD PRESSURE: 155 MMHG | HEART RATE: 100 BPM | DIASTOLIC BLOOD PRESSURE: 113 MMHG | WEIGHT: 177.69 LBS | BODY MASS INDEX: 29.61 KG/M2

## 2024-12-07 LAB
ALBUMIN SERPL-MCNC: 3 G/DL (ref 3.5–5)
ALBUMIN/GLOB SERPL: 0.6 (ref 1.1–2.2)
ALP SERPL-CCNC: 134 U/L (ref 45–117)
ALT SERPL-CCNC: 27 U/L (ref 12–78)
ANION GAP SERPL CALC-SCNC: 8 MMOL/L (ref 2–12)
APPEARANCE UR: ABNORMAL
AST SERPL-CCNC: 27 U/L (ref 15–37)
BACTERIA URNS QL MICRO: ABNORMAL /HPF
BASOPHILS # BLD: 0 K/UL (ref 0–0.1)
BASOPHILS NFR BLD: 0 % (ref 0–1)
BILIRUB SERPL-MCNC: 0.3 MG/DL (ref 0.2–1)
BILIRUB UR QL: NEGATIVE
BUN SERPL-MCNC: 11 MG/DL (ref 6–20)
BUN/CREAT SERPL: 10 (ref 12–20)
CALCIUM SERPL-MCNC: 9.6 MG/DL (ref 8.5–10.1)
CHLORIDE SERPL-SCNC: 102 MMOL/L (ref 97–108)
CO2 SERPL-SCNC: 26 MMOL/L (ref 21–32)
COLOR UR: ABNORMAL
COMMENT:: NORMAL
CREAT SERPL-MCNC: 1.11 MG/DL (ref 0.55–1.02)
DIFFERENTIAL METHOD BLD: ABNORMAL
EOSINOPHIL # BLD: 0 K/UL (ref 0–0.4)
EOSINOPHIL NFR BLD: 0 % (ref 0–7)
EPITH CASTS URNS QL MICRO: ABNORMAL /LPF
ERYTHROCYTE [DISTWIDTH] IN BLOOD BY AUTOMATED COUNT: 15.9 % (ref 11.5–14.5)
GLOBULIN SER CALC-MCNC: 4.7 G/DL (ref 2–4)
GLUCOSE SERPL-MCNC: 89 MG/DL (ref 65–100)
GLUCOSE UR STRIP.AUTO-MCNC: NEGATIVE MG/DL
HCT VFR BLD AUTO: 41.6 % (ref 35–47)
HGB BLD-MCNC: 13.5 G/DL (ref 11.5–16)
HGB UR QL STRIP: ABNORMAL
HYALINE CASTS URNS QL MICRO: ABNORMAL /LPF (ref 0–5)
IMM GRANULOCYTES # BLD AUTO: 0 K/UL (ref 0–0.04)
IMM GRANULOCYTES NFR BLD AUTO: 0 % (ref 0–0.5)
KETONES UR QL STRIP.AUTO: 15 MG/DL
LEUKOCYTE ESTERASE UR QL STRIP.AUTO: NEGATIVE
LYMPHOCYTES # BLD: 1.2 K/UL (ref 0.8–3.5)
LYMPHOCYTES NFR BLD: 16 % (ref 12–49)
MCH RBC QN AUTO: 28.7 PG (ref 26–34)
MCHC RBC AUTO-ENTMCNC: 32.5 G/DL (ref 30–36.5)
MCV RBC AUTO: 88.5 FL (ref 80–99)
MONOCYTES # BLD: 0.3 K/UL (ref 0–1)
MONOCYTES NFR BLD: 4 % (ref 5–13)
NEUTS SEG # BLD: 6.2 K/UL (ref 1.8–8)
NEUTS SEG NFR BLD: 80 % (ref 32–75)
NITRITE UR QL STRIP.AUTO: NEGATIVE
NRBC # BLD: 0 K/UL (ref 0–0.01)
NRBC BLD-RTO: 0 PER 100 WBC
PH UR STRIP: 6 (ref 5–8)
PLATELET # BLD AUTO: 455 K/UL (ref 150–400)
PMV BLD AUTO: 9.5 FL (ref 8.9–12.9)
POTASSIUM SERPL-SCNC: 3.4 MMOL/L (ref 3.5–5.1)
PROT SERPL-MCNC: 7.7 G/DL (ref 6.4–8.2)
PROT UR STRIP-MCNC: >300 MG/DL
RBC # BLD AUTO: 4.7 M/UL (ref 3.8–5.2)
RBC #/AREA URNS HPF: ABNORMAL /HPF (ref 0–5)
SODIUM SERPL-SCNC: 136 MMOL/L (ref 136–145)
SP GR UR REFRACTOMETRY: 1.03 (ref 1–1.03)
SPECIMEN HOLD: NORMAL
SPECIMEN HOLD: NORMAL
UROBILINOGEN UR QL STRIP.AUTO: 0.2 EU/DL (ref 0.2–1)
WBC # BLD AUTO: 7.8 K/UL (ref 3.6–11)
WBC URNS QL MICRO: ABNORMAL /HPF (ref 0–4)

## 2024-12-07 PROCEDURE — 1120000000 HC RM PRIVATE OB

## 2024-12-07 PROCEDURE — 81001 URINALYSIS AUTO W/SCOPE: CPT

## 2024-12-07 PROCEDURE — G0378 HOSPITAL OBSERVATION PER HR: HCPCS

## 2024-12-07 PROCEDURE — 93005 ELECTROCARDIOGRAM TRACING: CPT

## 2024-12-07 PROCEDURE — 36415 COLL VENOUS BLD VENIPUNCTURE: CPT

## 2024-12-07 PROCEDURE — 99282 EMERGENCY DEPT VISIT SF MDM: CPT

## 2024-12-07 PROCEDURE — 4500000002 HC ER NO CHARGE

## 2024-12-07 PROCEDURE — 80053 COMPREHEN METABOLIC PANEL: CPT

## 2024-12-07 PROCEDURE — 85025 COMPLETE CBC W/AUTO DIFF WBC: CPT

## 2024-12-07 RX ORDER — SODIUM CHLORIDE, SODIUM LACTATE, POTASSIUM CHLORIDE, AND CALCIUM CHLORIDE .6; .31; .03; .02 G/100ML; G/100ML; G/100ML; G/100ML
500 INJECTION, SOLUTION INTRAVENOUS ONCE
Status: DISCONTINUED | OUTPATIENT
Start: 2024-12-07 | End: 2024-12-08 | Stop reason: HOSPADM

## 2024-12-07 RX ORDER — NIFEDIPINE 30 MG/1
90 TABLET, EXTENDED RELEASE ORAL DAILY
Status: DISCONTINUED | OUTPATIENT
Start: 2024-12-08 | End: 2024-12-08 | Stop reason: HOSPADM

## 2024-12-07 ASSESSMENT — PAIN - FUNCTIONAL ASSESSMENT: PAIN_FUNCTIONAL_ASSESSMENT: NONE - DENIES PAIN

## 2024-12-08 VITALS
DIASTOLIC BLOOD PRESSURE: 84 MMHG | RESPIRATION RATE: 16 BRPM | SYSTOLIC BLOOD PRESSURE: 125 MMHG | HEART RATE: 78 BPM | TEMPERATURE: 98.4 F | OXYGEN SATURATION: 96 %

## 2024-12-08 LAB
ALBUMIN SERPL-MCNC: 2.4 G/DL (ref 3.5–5)
ALBUMIN/GLOB SERPL: 0.6 (ref 1.1–2.2)
ALP SERPL-CCNC: 118 U/L (ref 45–117)
ALT SERPL-CCNC: 21 U/L (ref 12–78)
ANION GAP SERPL CALC-SCNC: 7 MMOL/L (ref 2–12)
AST SERPL-CCNC: 21 U/L (ref 15–37)
BILIRUB SERPL-MCNC: 0.4 MG/DL (ref 0.2–1)
BUN SERPL-MCNC: 9 MG/DL (ref 6–20)
BUN/CREAT SERPL: 10 (ref 12–20)
CALCIUM SERPL-MCNC: 9.3 MG/DL (ref 8.5–10.1)
CHLORIDE SERPL-SCNC: 105 MMOL/L (ref 97–108)
CO2 SERPL-SCNC: 24 MMOL/L (ref 21–32)
CREAT SERPL-MCNC: 0.88 MG/DL (ref 0.55–1.02)
EKG ATRIAL RATE: 96 BPM
EKG DIAGNOSIS: NORMAL
EKG P AXIS: 52 DEGREES
EKG P-R INTERVAL: 148 MS
EKG Q-T INTERVAL: 356 MS
EKG QRS DURATION: 80 MS
EKG QTC CALCULATION (BAZETT): 447 MS
EKG R AXIS: 90 DEGREES
EKG T AXIS: 65 DEGREES
EKG VENTRICULAR RATE: 95 BPM
ERYTHROCYTE [DISTWIDTH] IN BLOOD BY AUTOMATED COUNT: 15.9 % (ref 11.5–14.5)
GLOBULIN SER CALC-MCNC: 4.1 G/DL (ref 2–4)
GLUCOSE SERPL-MCNC: 71 MG/DL (ref 65–100)
HCT VFR BLD AUTO: 36.1 % (ref 35–47)
HGB BLD-MCNC: 11.5 G/DL (ref 11.5–16)
MCH RBC QN AUTO: 28.4 PG (ref 26–34)
MCHC RBC AUTO-ENTMCNC: 31.9 G/DL (ref 30–36.5)
MCV RBC AUTO: 89.1 FL (ref 80–99)
NRBC # BLD: 0 K/UL (ref 0–0.01)
NRBC BLD-RTO: 0 PER 100 WBC
PLATELET # BLD AUTO: 391 K/UL (ref 150–400)
PMV BLD AUTO: 9.9 FL (ref 8.9–12.9)
POTASSIUM SERPL-SCNC: 3.4 MMOL/L (ref 3.5–5.1)
PROT SERPL-MCNC: 6.5 G/DL (ref 6.4–8.2)
RBC # BLD AUTO: 4.05 M/UL (ref 3.8–5.2)
SODIUM SERPL-SCNC: 136 MMOL/L (ref 136–145)
WBC # BLD AUTO: 6.1 K/UL (ref 3.6–11)

## 2024-12-08 PROCEDURE — 36415 COLL VENOUS BLD VENIPUNCTURE: CPT

## 2024-12-08 PROCEDURE — 80053 COMPREHEN METABOLIC PANEL: CPT

## 2024-12-08 PROCEDURE — 85027 COMPLETE CBC AUTOMATED: CPT

## 2024-12-08 PROCEDURE — G0378 HOSPITAL OBSERVATION PER HR: HCPCS

## 2024-12-08 PROCEDURE — 6370000000 HC RX 637 (ALT 250 FOR IP): Performed by: OBSTETRICS & GYNECOLOGY

## 2024-12-08 RX ADMIN — NIFEDIPINE 90 MG: 30 TABLET, FILM COATED, EXTENDED RELEASE ORAL at 11:20

## 2024-12-08 NOTE — ED TRIAGE NOTES
Patient arrives POV to ED cc of possible seizure activity today at a hair appointment. Patient delivered here about 2 weeks ago with severe preeclampsia at 35 weeks, via . Patient doesn't remember episode, but says her  told her patient's eyes rolled to the back of her head, she started shaking and slumped over.     Patient said she felt groggy after and her right arm was tingly. Patient denies any dizziness, visual disturbances, CP, or N/V in triage.

## 2024-12-08 NOTE — ED TRIAGE NOTES
2100-Received client via wheelchair with ED escort. Client states that she was at a hair appointment at about 1830 and her stylist stated that her eyes rolled back in her head and she started shaking and then she slumped over. Client states she just had a baby here via  2 weeks ago because of high blood pressure. She currently takes nifedipine 90mg daily and her blood pressures have been fine today.   -Dr. Tamez at beside talking to client   -Serial blood pressure within normal range. Report given to Sarah JAY on [facility]. Client transferred to  311

## 2024-12-08 NOTE — DISCHARGE SUMMARY
Obstetrical Discharge Summary     Name: Chanel Dudley MRN: [id number]  SSN: [id number]    YOB: 2000  Age: 24 y.o.  Sex: female      Admit Date: 2024    Discharge Date: 2024     Admitting Physician: Bolivar Tamez MD     Attending Physician:  Marisol Le MD     Admission Diagnoses: Preeclampsia in postpartum period [O14.95]    Discharge Diagnoses:   Information for the patient's :  RY Dudley [[id number]]   @[phone]@     Additional Diagnoses:  No components found for: \"OBEXTABORH\", \"OBEXTABSCRN\", \"OBEXTRUBELLA\", \"OBEXTGRBS\"    Hospital Course:  Post partum day 17 readmitted in the setting of possible seizure like activity. On admission and throughout her hospital course, her blood pressures have been normotensive and she remains on procardia 90mg XL. The patient also reports a week of a diarrhea illness. Creatinine on admission elevated to 1.11 but downtrended to 0.8 with IV hydration; suspect prerenal cause given response to fluids. EEG negative and mild hypokalemia in the setting of recent diarrhea. The remainder of her labs and course was uneventful.     She was discharged home  if stable condition with 1 week outpatient follow-up. Resume zoloft.     Patient Instructions:   Current Discharge Medication List        CONTINUE these medications which have NOT CHANGED    Details   NIFEdipine (PROCARDIA XL) 30 MG extended release tablet Take 3 tablets by mouth daily  Qty: 90 tablet, Refills: 1      Multiple Vitamins-Minerals (THERAPEUTIC MULTIVITAMIN-MINERALS) tablet Take 1 tablet by mouth daily      ibuprofen (ADVIL;MOTRIN) 800 MG tablet Take 1 tablet by mouth every 8 hours as needed for Pain  Qty: 30 tablet, Refills: 1             Disposition at Discharge: Home or self care    Condition at Discharge: Stable    Reference my discharge instructions.    Needs 1 week follow-up for BP evaluation.     Signed By:  Marisol Le MD     2024

## 2024-12-08 NOTE — ED PROVIDER NOTES
25 y/o  S/P LTCS( )  complicated by Severe pre-E S/P PP Mag x 24 hrs discharged home on   on procardia XL 90 mg a day.  Pt initially presented to the main ED with c/o having a witnessed seizure around 6.30 pm. States she was at the hairdresser and they noticed that she rolled her eyes lasted approx 10 secs with some jerky movements of her body and soon after the episode she felt some tingling in her hands and felt tired.  She did mention that she felt tired today and usually at the  she would fall asleep while getting her hair done.    She mentions having labs done in the ED,and EEG and soon after was sent up to [facility] for further evaluation.    On arrival to the [facility], denies any HA, visual changes, epigastric pain, no tingling sensation in her extremities  Bps here 132/79, 131/81, 132/83 mmHg    Labs reviewed within normal limits other than creatinine elevated at 1.11. (on  was 1.07)  K slightly decreased at 3.4          Seizures         Chief Complaint   Patient presents with    Seizures     Client staes she may have had a seizure today          Past Medical History:   Diagnosis Date    Mental disorder        Past Surgical History:   Procedure Laterality Date     SECTION N/A 2024     SECTION performed by Jessica Stevens MD at [facility]         No family history on file.    Social History     Socioeconomic History    Marital status: Single     Spouse name: Not on file    Number of children: Not on file    Years of education: Not on file    Highest education level: Not on file   Occupational History    Not on file   Tobacco Use    Smoking status: Never    Smokeless tobacco: Never   Substance and Sexual Activity    Alcohol use: Not Currently    Drug use: Never    Sexual activity: Not on file   Other Topics Concern    Not on file   Social History Narrative    Not on file     Social Determinants of Health     Financial Resource Strain: Not on file   Food Insecurity:

## 2024-12-08 NOTE — H&P
23 y/o  S/P LTCS( )  complicated by Severe pre-E S/P PP Mag x 24 hrs discharged home on   on procardia XL 90 mg a day.  Pt initially presented to the main ED with c/o having a witnessed seizure around 6.30 pm. States she was at the hairdresser and they noticed that she rolled her eyes lasted approx 10 secs with some jerky movements of her body and soon after the episode she felt some tingling in her hands and felt tired.  She did mention that she felt tired today and usually at the  she would fall asleep while getting her hair done.     She mentions having labs done in the ED,and EEG and soon after was sent up to [facility] for further evaluation.     On arrival to the [facility], denies any HA, visual changes, epigastric pain, no tingling sensation in her extremities  Bps here 132/79, 131/81, 132/83 mmHg     Labs reviewed within normal limits other than creatinine elevated at 1.11. (on  was 1.07)  K slightly decreased at 3.4              Seizures                Chief Complaint   Patient presents with    Seizures       Client staes she may have had a seizure today             Past Medical History        Past Medical History:   Diagnosis Date    Mental disorder              Past Surgical History         Past Surgical History:   Procedure Laterality Date     SECTION N/A 2024      SECTION performed by Jessica Stevens MD at [facility]             Family History   No family history on file.        Social History               Socioeconomic History    Marital status: Single       Spouse name: Not on file    Number of children: Not on file    Years of education: Not on file    Highest education level: Not on file   Occupational History    Not on file   Tobacco Use    Smoking status: Never    Smokeless tobacco: Never   Substance and Sexual Activity    Alcohol use: Not Currently    Drug use: Never    Sexual activity: Not on file   Other Topics Concern    Not on file   Social History

## 2024-12-08 NOTE — PROGRESS NOTES
TRANSFER - IN REPORT:    Verbal report received from Julieth JAY on Chanel Dudley  being received from L&D for routine progression of patient care      Report consisted of patient's Situation, Background, Assessment and   Recommendations(SBAR).     Information from the following report(s) Nurse Handoff Report, ED Encounter Summary, and Recent Results was reviewed with the receiving nurse.    Opportunity for questions and clarification was provided.      Assessment completed upon patient's arrival to unit and care assumed.

## 2024-12-08 NOTE — PROGRESS NOTES
Post-Partum Day Number 17 Progress Note    Chanel Dudley     Assessment: Post partum day 17 readmitted in the setting of possible seizure like activity though on further discussion concern for acute dehydration and syncope in the setting of a recent diarrheal illness.    Plan:     Preeclampsia with Severe features, now postpartum  - blood pressures normotensive at this time  - remains on procardia 90mg XL  - continue to monitor blood pressure at home  - suspect recent diarrheal illness over the past week is the main cause of her recent event on , she has been maintaining her usually PO hydration but not increasing despite ongoing diarrhea.  - if dizziness continues, could be secondary to a cardiac or pulmonary cause and further evaluation should be evaluated at that time.   - EEG negative  - mild hypokalemia in the setting of recent diarrhea     Postpartum Care  - Discharge home today.  - continue home zoloft, low suspicion that this contributed   - Follow up in office in 1 week(s) with [facility].  - Pain medication prescription(s) sent.  - Questions answered.    Discharge Time: Greater than 30 minutes were spent discharging the patient. She should follow-up in the office in 1 week for another mood and blood pressure check or sooner if her diarrhea returns.     Information for the patient's :  Octavia, RY Buchanan [[id number]]   , Low Transverse     Patient is okay. Has a lot going on, difficult to be away from baby who is still in the NICU. Denies ongoing dizziness, notes that it is most prominent when standing up quickly. She notes that she started taking zoloft on Monday for mood symptoms but even before starting this medication she has been experiencing diarrhea. Started taking imodium which is now helping and she hasn't had diarrhea today. Notes that during this she was drinking the same amount but not increasing her intake. No new mood symptoms since starting zoloft.

## 2024-12-08 NOTE — DISCHARGE INSTRUCTIONS
Please monitor your blood pressures at home and watch for signs and symptoms of preeclampsia.   Take your blood pressure at home twice a day; morning and evening. Take your blood pressure sitting upright in a hard back chair, your arm at the level of your heart and resting on a surface. Make sure you have been sitting for at least 10 min before you take your blood pressure. If you blood pressure is elevated, wait 10 min and repeat your blood pressure.   Call for blood pressures where the top value (systolic) is 160 or higher; and/or call if the bottom number (diastolic) is 110 or higher.   If you blood pressure remains high at home, please call our office or come to the hospital.   Warning signs for preeclampsia include: headaches not improved with tylenol/rest/caffeine/water, chest pain, shortness of breath at rest, consistent pain in the right upper quadrant of your abdomen, vision changes (black cutouts in your vision), or persistent nausea and vomiting.

## 2024-12-08 NOTE — ED NOTES
24-year-old female with a history of preeclampsia presents with suspected seizure.  States she was at a hair appointment when somebody witnessed her change of mentation and rolled her eyes back.  Afterwards she felt flushed and \"like coming out of water.\"  States she is not sure if it was a seizure or if she passed.  Delivered via  at 35 weeks (2024) due to IUGR and preeclampsia.  \"My blood pressure was normal today 122/84.\"    Denies fevers, nausea, vomiting, abdominal pain, headaches, dizziness, visual disturbance, lower extremity swelling.  Does endorse some recent diarrhea.  She has been rehydrating.    7:43 PM  I have evaluated the patient as the Provider in Rapid Medical Evaluation (RME). I have reviewed her vital signs and the triage nurse assessment. I have talked with the patient and any available family and advised that I am the provider in triage and have ordered the appropriate study to initiate their work up based on the clinical presentation during my assessment. I have advised that the patient will be accommodated in the Main ED as soon as possible. I have also requested to contact the triage nurse or myself immediately if the patient experiences any changes in their condition during this brief waiting period.  VEGA Burnett NP, Kelly E, APRN - NP  24

## 2025-05-10 SDOH — HEALTH STABILITY: PHYSICAL HEALTH: ON AVERAGE, HOW MANY DAYS PER WEEK DO YOU ENGAGE IN MODERATE TO STRENUOUS EXERCISE (LIKE A BRISK WALK)?: 2 DAYS

## 2025-05-10 SDOH — HEALTH STABILITY: PHYSICAL HEALTH: ON AVERAGE, HOW MANY MINUTES DO YOU ENGAGE IN EXERCISE AT THIS LEVEL?: 90 MIN

## 2025-05-13 ENCOUNTER — OFFICE VISIT (OUTPATIENT)
Facility: CLINIC | Age: 25
End: 2025-05-13

## 2025-05-13 VITALS
TEMPERATURE: 98.2 F | BODY MASS INDEX: 26.16 KG/M2 | DIASTOLIC BLOOD PRESSURE: 79 MMHG | RESPIRATION RATE: 16 BRPM | HEIGHT: 65 IN | OXYGEN SATURATION: 97 % | HEART RATE: 111 BPM | SYSTOLIC BLOOD PRESSURE: 118 MMHG | WEIGHT: 157 LBS

## 2025-05-13 DIAGNOSIS — Z00.00 ADULT GENERAL MEDICAL EXAM: Primary | ICD-10-CM

## 2025-05-13 DIAGNOSIS — R87.619 ABNORMAL CERVICAL PAPANICOLAOU SMEAR, UNSPECIFIED ABNORMAL PAP FINDING: ICD-10-CM

## 2025-05-13 DIAGNOSIS — F41.9 ANXIETY: ICD-10-CM

## 2025-05-13 DIAGNOSIS — Z13.220 LIPID SCREENING: ICD-10-CM

## 2025-05-13 DIAGNOSIS — F90.9 ATTENTION DEFICIT HYPERACTIVITY DISORDER (ADHD), UNSPECIFIED ADHD TYPE: ICD-10-CM

## 2025-05-13 PROBLEM — N94.6 DYSMENORRHEA: Status: RESOLVED | Noted: 2017-04-29 | Resolved: 2025-05-13

## 2025-05-13 PROBLEM — Z3A.34 34 WEEKS GESTATION OF PREGNANCY: Status: RESOLVED | Noted: 2024-11-20 | Resolved: 2025-05-13

## 2025-05-13 PROBLEM — Z87.59 HISTORY OF PRE-ECLAMPSIA: Status: ACTIVE | Noted: 2024-12-07

## 2025-05-13 PROBLEM — R11.2 NAUSEA AND VOMITING: Status: RESOLVED | Noted: 2024-11-20 | Resolved: 2025-05-13

## 2025-05-13 ASSESSMENT — PATIENT HEALTH QUESTIONNAIRE - PHQ9
2. FEELING DOWN, DEPRESSED OR HOPELESS: NOT AT ALL
SUM OF ALL RESPONSES TO PHQ QUESTIONS 1-9: 0
1. LITTLE INTEREST OR PLEASURE IN DOING THINGS: NOT AT ALL
SUM OF ALL RESPONSES TO PHQ QUESTIONS 1-9: 0

## 2025-05-13 NOTE — PROGRESS NOTES
Chief Complaint   Patient presents with    Establish Care     Pt is here establishing care     New Patient       
individuals in attendance with the patient were advised that Artificial Intelligence will be utilized during this visit to record, process the conversation to generate a clinical note and to support improvement of the AI technology. The patient (or guardian, if applicable) and other individuals in attendance at the appointment consented to the use of AI, including the recording.      An electronic signature was used to authenticate this note.    --Amanda Young MD

## 2025-05-14 ENCOUNTER — RESULTS FOLLOW-UP (OUTPATIENT)
Facility: CLINIC | Age: 25
End: 2025-05-14

## 2025-05-14 LAB
ALBUMIN SERPL-MCNC: 4.6 G/DL (ref 4–5)
ALP SERPL-CCNC: 78 IU/L (ref 44–121)
ALT SERPL-CCNC: 10 IU/L (ref 0–32)
AST SERPL-CCNC: 18 IU/L (ref 0–40)
BASOPHILS # BLD AUTO: 0 X10E3/UL (ref 0–0.2)
BASOPHILS NFR BLD AUTO: 0 %
BILIRUB SERPL-MCNC: 0.5 MG/DL (ref 0–1.2)
BUN SERPL-MCNC: 14 MG/DL (ref 6–20)
BUN/CREAT SERPL: 16 (ref 9–23)
CALCIUM SERPL-MCNC: 9.7 MG/DL (ref 8.7–10.2)
CHLORIDE SERPL-SCNC: 104 MMOL/L (ref 96–106)
CHOLEST SERPL-MCNC: 214 MG/DL (ref 100–199)
CO2 SERPL-SCNC: 19 MMOL/L (ref 20–29)
CREAT SERPL-MCNC: 0.9 MG/DL (ref 0.57–1)
EGFRCR SERPLBLD CKD-EPI 2021: 90 ML/MIN/1.73
EOSINOPHIL # BLD AUTO: 0 X10E3/UL (ref 0–0.4)
EOSINOPHIL NFR BLD AUTO: 0 %
ERYTHROCYTE [DISTWIDTH] IN BLOOD BY AUTOMATED COUNT: 13.4 % (ref 11.7–15.4)
GLOBULIN SER CALC-MCNC: 2.3 G/DL (ref 1.5–4.5)
GLUCOSE SERPL-MCNC: 73 MG/DL (ref 70–99)
HCT VFR BLD AUTO: 37.7 % (ref 34–46.6)
HDLC SERPL-MCNC: 83 MG/DL
HGB BLD-MCNC: 12.4 G/DL (ref 11.1–15.9)
IMM GRANULOCYTES # BLD AUTO: 0 X10E3/UL (ref 0–0.1)
IMM GRANULOCYTES NFR BLD AUTO: 0 %
LDLC SERPL CALC-MCNC: 122 MG/DL (ref 0–99)
LYMPHOCYTES # BLD AUTO: 2 X10E3/UL (ref 0.7–3.1)
LYMPHOCYTES NFR BLD AUTO: 32 %
MCH RBC QN AUTO: 29.2 PG (ref 26.6–33)
MCHC RBC AUTO-ENTMCNC: 32.9 G/DL (ref 31.5–35.7)
MCV RBC AUTO: 89 FL (ref 79–97)
MONOCYTES # BLD AUTO: 0.3 X10E3/UL (ref 0.1–0.9)
MONOCYTES NFR BLD AUTO: 5 %
NEUTROPHILS # BLD AUTO: 3.9 X10E3/UL (ref 1.4–7)
NEUTROPHILS NFR BLD AUTO: 63 %
PLATELET # BLD AUTO: 246 X10E3/UL (ref 150–450)
POTASSIUM SERPL-SCNC: 4 MMOL/L (ref 3.5–5.2)
PROT SERPL-MCNC: 6.9 G/DL (ref 6–8.5)
RBC # BLD AUTO: 4.24 X10E6/UL (ref 3.77–5.28)
SODIUM SERPL-SCNC: 139 MMOL/L (ref 134–144)
TRIGL SERPL-MCNC: 53 MG/DL (ref 0–149)
VLDLC SERPL CALC-MCNC: 9 MG/DL (ref 5–40)
WBC # BLD AUTO: 6.2 X10E3/UL (ref 3.4–10.8)

## 2025-05-27 ASSESSMENT — PATIENT HEALTH QUESTIONNAIRE - PHQ9
2. FEELING DOWN, DEPRESSED OR HOPELESS: NOT AT ALL
6. FEELING BAD ABOUT YOURSELF - OR THAT YOU ARE A FAILURE OR HAVE LET YOURSELF OR YOUR FAMILY DOWN: NOT AT ALL
SUM OF ALL RESPONSES TO PHQ QUESTIONS 1-9: 4
10. IF YOU CHECKED OFF ANY PROBLEMS, HOW DIFFICULT HAVE THESE PROBLEMS MADE IT FOR YOU TO DO YOUR WORK, TAKE CARE OF THINGS AT HOME, OR GET ALONG WITH OTHER PEOPLE: SOMEWHAT DIFFICULT
8. MOVING OR SPEAKING SO SLOWLY THAT OTHER PEOPLE COULD HAVE NOTICED. OR THE OPPOSITE, BEING SO FIGETY OR RESTLESS THAT YOU HAVE BEEN MOVING AROUND A LOT MORE THAN USUAL: NOT AT ALL
7. TROUBLE CONCENTRATING ON THINGS, SUCH AS READING THE NEWSPAPER OR WATCHING TELEVISION: NOT AT ALL
9. THOUGHTS THAT YOU WOULD BE BETTER OFF DEAD, OR OF HURTING YOURSELF: NOT AT ALL
SUM OF ALL RESPONSES TO PHQ QUESTIONS 1-9: 4
5. POOR APPETITE OR OVEREATING: SEVERAL DAYS
7. TROUBLE CONCENTRATING ON THINGS, SUCH AS READING THE NEWSPAPER OR WATCHING TELEVISION: NOT AT ALL
3. TROUBLE FALLING OR STAYING ASLEEP: NOT AT ALL
1. LITTLE INTEREST OR PLEASURE IN DOING THINGS: SEVERAL DAYS
5. POOR APPETITE OR OVEREATING: SEVERAL DAYS
SUM OF ALL RESPONSES TO PHQ QUESTIONS 1-9: 4
1. LITTLE INTEREST OR PLEASURE IN DOING THINGS: SEVERAL DAYS
3. TROUBLE FALLING OR STAYING ASLEEP: NOT AT ALL
SUM OF ALL RESPONSES TO PHQ QUESTIONS 1-9: 4
2. FEELING DOWN, DEPRESSED OR HOPELESS: NOT AT ALL
10. IF YOU CHECKED OFF ANY PROBLEMS, HOW DIFFICULT HAVE THESE PROBLEMS MADE IT FOR YOU TO DO YOUR WORK, TAKE CARE OF THINGS AT HOME, OR GET ALONG WITH OTHER PEOPLE: SOMEWHAT DIFFICULT
4. FEELING TIRED OR HAVING LITTLE ENERGY: MORE THAN HALF THE DAYS
8. MOVING OR SPEAKING SO SLOWLY THAT OTHER PEOPLE COULD HAVE NOTICED. OR THE OPPOSITE - BEING SO FIDGETY OR RESTLESS THAT YOU HAVE BEEN MOVING AROUND A LOT MORE THAN USUAL: NOT AT ALL
4. FEELING TIRED OR HAVING LITTLE ENERGY: MORE THAN HALF THE DAYS
9. THOUGHTS THAT YOU WOULD BE BETTER OFF DEAD, OR OF HURTING YOURSELF: NOT AT ALL
SUM OF ALL RESPONSES TO PHQ QUESTIONS 1-9: 4
6. FEELING BAD ABOUT YOURSELF - OR THAT YOU ARE A FAILURE OR HAVE LET YOURSELF OR YOUR FAMILY DOWN: NOT AT ALL

## 2025-05-27 ASSESSMENT — ANXIETY QUESTIONNAIRES
4. TROUBLE RELAXING: SEVERAL DAYS
1. FEELING NERVOUS, ANXIOUS, OR ON EDGE: SEVERAL DAYS
3. WORRYING TOO MUCH ABOUT DIFFERENT THINGS: SEVERAL DAYS
6. BECOMING EASILY ANNOYED OR IRRITABLE: SEVERAL DAYS
7. FEELING AFRAID AS IF SOMETHING AWFUL MIGHT HAPPEN: NOT AT ALL
5. BEING SO RESTLESS THAT IT IS HARD TO SIT STILL: NOT AT ALL
2. NOT BEING ABLE TO STOP OR CONTROL WORRYING: SEVERAL DAYS
7. FEELING AFRAID AS IF SOMETHING AWFUL MIGHT HAPPEN: NOT AT ALL
5. BEING SO RESTLESS THAT IT IS HARD TO SIT STILL: NOT AT ALL
4. TROUBLE RELAXING: SEVERAL DAYS
6. BECOMING EASILY ANNOYED OR IRRITABLE: SEVERAL DAYS
2. NOT BEING ABLE TO STOP OR CONTROL WORRYING: SEVERAL DAYS
IF YOU CHECKED OFF ANY PROBLEMS ON THIS QUESTIONNAIRE, HOW DIFFICULT HAVE THESE PROBLEMS MADE IT FOR YOU TO DO YOUR WORK, TAKE CARE OF THINGS AT HOME, OR GET ALONG WITH OTHER PEOPLE: SOMEWHAT DIFFICULT
1. FEELING NERVOUS, ANXIOUS, OR ON EDGE: SEVERAL DAYS
GAD7 TOTAL SCORE: 5
3. WORRYING TOO MUCH ABOUT DIFFERENT THINGS: SEVERAL DAYS
IF YOU CHECKED OFF ANY PROBLEMS ON THIS QUESTIONNAIRE, HOW DIFFICULT HAVE THESE PROBLEMS MADE IT FOR YOU TO DO YOUR WORK, TAKE CARE OF THINGS AT HOME, OR GET ALONG WITH OTHER PEOPLE: SOMEWHAT DIFFICULT

## 2025-05-27 ASSESSMENT — LIFESTYLE VARIABLES
PAST THREE MONTHS WHAT IS THE LARGEST AMOUNT OF ALCOHOLIC DRINKS YOU HAVE CONSUMED IN ONE DAY: 0
ALCOHOL_DAYS_PER_WEEK: 0
HISTORY_ALCOHOL_USE: NO
HAVE YOU EVER RECEIVED ALCOHOL OR OTHER DRUG ABUSE TREATMENT: NO

## 2025-05-29 ENCOUNTER — OFFICE VISIT (OUTPATIENT)
Age: 25
End: 2025-05-29

## 2025-05-29 VITALS
RESPIRATION RATE: 16 BRPM | HEART RATE: 98 BPM | OXYGEN SATURATION: 100 % | WEIGHT: 157 LBS | BODY MASS INDEX: 26.16 KG/M2 | SYSTOLIC BLOOD PRESSURE: 121 MMHG | DIASTOLIC BLOOD PRESSURE: 88 MMHG | TEMPERATURE: 98 F | HEIGHT: 65 IN

## 2025-05-29 DIAGNOSIS — Z87.59 HISTORY OF POSTPARTUM DEPRESSION: ICD-10-CM

## 2025-05-29 DIAGNOSIS — F41.1 GAD (GENERALIZED ANXIETY DISORDER): ICD-10-CM

## 2025-05-29 DIAGNOSIS — F33.1 MODERATE EPISODE OF RECURRENT MAJOR DEPRESSIVE DISORDER (HCC): Primary | ICD-10-CM

## 2025-05-29 DIAGNOSIS — Z86.59 HISTORY OF POSTPARTUM DEPRESSION: ICD-10-CM

## 2025-05-29 NOTE — PROGRESS NOTES
Chief Complaint   Patient presents with    New Patient      Vitals:    05/29/25 1445   BP: 121/88   Pulse: 98   Resp: 16   Temp: 98 °F (36.7 °C)   SpO2: 100%      Prior to Admission medications    Medication Sig Start Date End Date Taking? Authorizing Provider   sertraline (ZOLOFT) 50 MG tablet Take 1 tablet by mouth daily   Yes ProviderLucina MD   Multiple Vitamins-Minerals (THERAPEUTIC MULTIVITAMIN-MINERALS) tablet Take 1 tablet by mouth daily   Yes Lucina Sanchez MD            5/27/2025     6:46 PM 5/13/2025     3:15 PM   PHQ-9    Little interest or pleasure in doing things 1 0   Feeling down, depressed, or hopeless 0 0   Trouble falling or staying asleep, or sleeping too much 0    Feeling tired or having little energy 2    Poor appetite or overeating 1    Feeling bad about yourself - or that you are a failure or have let yourself or your family down 0    Trouble concentrating on things, such as reading the newspaper or watching television 0    Moving or speaking so slowly that other people could have noticed. Or the opposite - being so fidgety or restless that you have been moving around a lot more than usual 0    Thoughts that you would be better off dead, or of hurting yourself in some way 0    PHQ-2 Score 1  0   PHQ-9 Total Score 4  0   If you checked off any problems, how difficult have these problems made it for you to do your work, take care of things at home, or get along with other people? 1        Patient-reported           5/27/2025     6:45 PM   MARY-7 SCREENING   Feeling nervous, anxious, or on edge Several days   Not being able to stop or control worrying Several days   Worrying too much about different things Several days   Trouble relaxing Several days   Being so restless that it is hard to sit still Not at all   Becoming easily annoyed or irritable Several days   Feeling afraid as if something awful might happen Not at all   MARY-7 Total Score 5    How difficult have these problems 
Patient Position: Sitting, BP Cuff Size: Medium Adult)   Pulse 98   Temp 98 °F (36.7 °C) (Oral)   Resp 16   Ht 1.651 m (5' 5\")   Wt 71.2 kg (157 lb)   LMP 05/01/2025 (Exact Date)   SpO2 100%   BMI 26.13 kg/m²     Labs / Imaging: Chart reviewed for any laboratory results pertinent to this assessment. No significant findings.    MSE: Mental Status exam: WNL except for    Sensorium  oriented to time, place and person   Relations cooperative    Eye Contact    Intermittent, eye rolling   Appearance:  within normal Limits   Motor Behavior:  within normal limits   Speech:  normal pitch and normal volume   Thought Process: goal directed   Thought Content no hallucinations and no delusions   Suicidal ideations none   Homicidal ideations none   Mood:  anxious, depressed, and dysthymic   Affect:  mood-congruent   Memory recent  adequate   Memory remote:  adequate   Concentration:  adequate   Abstraction:  abstract   Insight:  good   Reliability good   Judgment:  good       Assessment:     Chanel Dudley is a 24 y.o. year old female who presents with depression and anxiety, which has worsened since she delivered her daughter in November. She is was restarted on Zoloft after delivery due to postpartum symptoms. She is currently still having anxiety symptoms and feeling overwhelmed. She notes that she has trouble with focusing on tasks and getting things done around the house at times, and sometimes with work related tasks.      Diagnoses:    Diagnosis Orders   1. Moderate episode of recurrent major depressive disorder (HCC)        2. MARY (generalized anxiety disorder)        3. History of postpartum depression        History of postpartum anxiety      Plan:    Increase Zoloft to 75 mg for 1 week, then increase to 100 mg     Consider ADHD neuropsych testing, when anxiety symptoms have improved    Referral to psychotherapy: Y, psychology today list for medicaid accepting therapists  Follow up: Return in about 3 weeks

## 2025-06-16 ASSESSMENT — PATIENT HEALTH QUESTIONNAIRE - PHQ9
3. TROUBLE FALLING OR STAYING ASLEEP: NOT AT ALL
1. LITTLE INTEREST OR PLEASURE IN DOING THINGS: SEVERAL DAYS
SUM OF ALL RESPONSES TO PHQ QUESTIONS 1-9: 2
SUM OF ALL RESPONSES TO PHQ QUESTIONS 1-9: 2
8. MOVING OR SPEAKING SO SLOWLY THAT OTHER PEOPLE COULD HAVE NOTICED. OR THE OPPOSITE - BEING SO FIDGETY OR RESTLESS THAT YOU HAVE BEEN MOVING AROUND A LOT MORE THAN USUAL: NOT AT ALL
3. TROUBLE FALLING OR STAYING ASLEEP: NOT AT ALL
8. MOVING OR SPEAKING SO SLOWLY THAT OTHER PEOPLE COULD HAVE NOTICED. OR THE OPPOSITE, BEING SO FIGETY OR RESTLESS THAT YOU HAVE BEEN MOVING AROUND A LOT MORE THAN USUAL: NOT AT ALL
6. FEELING BAD ABOUT YOURSELF - OR THAT YOU ARE A FAILURE OR HAVE LET YOURSELF OR YOUR FAMILY DOWN: NOT AT ALL
SUM OF ALL RESPONSES TO PHQ QUESTIONS 1-9: 2
5. POOR APPETITE OR OVEREATING: NOT AT ALL
4. FEELING TIRED OR HAVING LITTLE ENERGY: SEVERAL DAYS
10. IF YOU CHECKED OFF ANY PROBLEMS, HOW DIFFICULT HAVE THESE PROBLEMS MADE IT FOR YOU TO DO YOUR WORK, TAKE CARE OF THINGS AT HOME, OR GET ALONG WITH OTHER PEOPLE: SOMEWHAT DIFFICULT
7. TROUBLE CONCENTRATING ON THINGS, SUCH AS READING THE NEWSPAPER OR WATCHING TELEVISION: NOT AT ALL
SUM OF ALL RESPONSES TO PHQ QUESTIONS 1-9: 2
10. IF YOU CHECKED OFF ANY PROBLEMS, HOW DIFFICULT HAVE THESE PROBLEMS MADE IT FOR YOU TO DO YOUR WORK, TAKE CARE OF THINGS AT HOME, OR GET ALONG WITH OTHER PEOPLE: SOMEWHAT DIFFICULT
2. FEELING DOWN, DEPRESSED OR HOPELESS: NOT AT ALL
2. FEELING DOWN, DEPRESSED OR HOPELESS: NOT AT ALL
SUM OF ALL RESPONSES TO PHQ QUESTIONS 1-9: 2
9. THOUGHTS THAT YOU WOULD BE BETTER OFF DEAD, OR OF HURTING YOURSELF: NOT AT ALL
4. FEELING TIRED OR HAVING LITTLE ENERGY: SEVERAL DAYS
5. POOR APPETITE OR OVEREATING: NOT AT ALL
1. LITTLE INTEREST OR PLEASURE IN DOING THINGS: SEVERAL DAYS
6. FEELING BAD ABOUT YOURSELF - OR THAT YOU ARE A FAILURE OR HAVE LET YOURSELF OR YOUR FAMILY DOWN: NOT AT ALL
7. TROUBLE CONCENTRATING ON THINGS, SUCH AS READING THE NEWSPAPER OR WATCHING TELEVISION: NOT AT ALL
9. THOUGHTS THAT YOU WOULD BE BETTER OFF DEAD, OR OF HURTING YOURSELF: NOT AT ALL

## 2025-06-16 ASSESSMENT — ANXIETY QUESTIONNAIRES
1. FEELING NERVOUS, ANXIOUS, OR ON EDGE: SEVERAL DAYS
5. BEING SO RESTLESS THAT IT IS HARD TO SIT STILL: NOT AT ALL
1. FEELING NERVOUS, ANXIOUS, OR ON EDGE: SEVERAL DAYS
5. BEING SO RESTLESS THAT IT IS HARD TO SIT STILL: NOT AT ALL
IF YOU CHECKED OFF ANY PROBLEMS ON THIS QUESTIONNAIRE, HOW DIFFICULT HAVE THESE PROBLEMS MADE IT FOR YOU TO DO YOUR WORK, TAKE CARE OF THINGS AT HOME, OR GET ALONG WITH OTHER PEOPLE: SOMEWHAT DIFFICULT
GAD7 TOTAL SCORE: 5
2. NOT BEING ABLE TO STOP OR CONTROL WORRYING: SEVERAL DAYS
6. BECOMING EASILY ANNOYED OR IRRITABLE: SEVERAL DAYS
3. WORRYING TOO MUCH ABOUT DIFFERENT THINGS: SEVERAL DAYS
7. FEELING AFRAID AS IF SOMETHING AWFUL MIGHT HAPPEN: SEVERAL DAYS
4. TROUBLE RELAXING: NOT AT ALL
4. TROUBLE RELAXING: NOT AT ALL
IF YOU CHECKED OFF ANY PROBLEMS ON THIS QUESTIONNAIRE, HOW DIFFICULT HAVE THESE PROBLEMS MADE IT FOR YOU TO DO YOUR WORK, TAKE CARE OF THINGS AT HOME, OR GET ALONG WITH OTHER PEOPLE: SOMEWHAT DIFFICULT
2. NOT BEING ABLE TO STOP OR CONTROL WORRYING: SEVERAL DAYS
3. WORRYING TOO MUCH ABOUT DIFFERENT THINGS: SEVERAL DAYS
6. BECOMING EASILY ANNOYED OR IRRITABLE: SEVERAL DAYS
7. FEELING AFRAID AS IF SOMETHING AWFUL MIGHT HAPPEN: SEVERAL DAYS

## 2025-06-19 ENCOUNTER — OFFICE VISIT (OUTPATIENT)
Age: 25
End: 2025-06-19
Payer: MEDICAID

## 2025-06-19 VITALS
WEIGHT: 157 LBS | HEIGHT: 65 IN | OXYGEN SATURATION: 100 % | BODY MASS INDEX: 26.16 KG/M2 | RESPIRATION RATE: 16 BRPM | DIASTOLIC BLOOD PRESSURE: 96 MMHG | TEMPERATURE: 97 F | SYSTOLIC BLOOD PRESSURE: 138 MMHG | HEART RATE: 100 BPM

## 2025-06-19 DIAGNOSIS — Z87.59 HISTORY OF POSTPARTUM DEPRESSION: ICD-10-CM

## 2025-06-19 DIAGNOSIS — F41.1 GAD (GENERALIZED ANXIETY DISORDER): ICD-10-CM

## 2025-06-19 DIAGNOSIS — Z86.59 HISTORY OF POSTPARTUM DEPRESSION: ICD-10-CM

## 2025-06-19 DIAGNOSIS — F33.1 MODERATE EPISODE OF RECURRENT MAJOR DEPRESSIVE DISORDER (HCC): Primary | ICD-10-CM

## 2025-06-19 DIAGNOSIS — R41.840 ATTENTION DEFICIT: ICD-10-CM

## 2025-06-19 PROCEDURE — 99214 OFFICE O/P EST MOD 30 MIN: CPT | Performed by: STUDENT IN AN ORGANIZED HEALTH CARE EDUCATION/TRAINING PROGRAM

## 2025-06-19 RX ORDER — TRETINOIN 0.5 MG/G
0.1 CREAM TOPICAL NIGHTLY
COMMUNITY

## 2025-06-19 RX ORDER — SERTRALINE HYDROCHLORIDE 100 MG/1
100 TABLET, FILM COATED ORAL DAILY
Qty: 30 TABLET | Refills: 1 | Status: SHIPPED | OUTPATIENT
Start: 2025-06-19

## 2025-06-19 NOTE — PROGRESS NOTES
Psychiatry Consult Follow Up Note  Reason for consult: anxiety and ADHD  Please see full consult note written on: 5/29/25    PHQ-9 Total Score: (Patient-Rptd) 2 (6/16/2025  8:30 PM)  Thoughts that you would be better off dead, or of hurting yourself in some way: (Patient-Rptd) 0 (6/16/2025  8:30 PM)     Decrease by 2 point   Mild       6/16/2025     8:30 PM 5/27/2025     6:45 PM   MARY 7 SCORE   MARY-7 Total Score 5  5        Patient-reported     Mild    Clinical summary of events since last encounter:    Patient states that she is taking the Sertraline daily, denies side effects.     Patient states this week she has been a little anxious due to starting classes.     Patient states that she has a few days off from work.     Patient states she is having the LEEP procedure tomorrow.     Patient states her depression is better than the last visit. \"I am more optimistic, I have something to occupy my time, I am focused on school\".     Patient states overall her anxiety is \"normal, unless something unexpected happens, then it throws me off for about 30 minutes, but I am able to get back to what I was doing\".     Patient states things with boyfriend are about the same. She denies arguments.     Patient states she has been sleeping \"okay, I am not getting as much sleep because I have a lot going on, with school and work\". She takes naps as needed.     At this time, patient denies SI/HI/AVH.     Patient did not endorse abdominal pain, nausea, vomiting, constipation, diarrhea, paresthesia, akathisia, tremors, headache, vision changes, lightheadedness, dizziness, dry mouth, or dry eyes.     Meds:  Current Outpatient Medications   Medication Sig    tretinoin (RETIN-A) 0.05 % cream Apply 0.1 g topically nightly Apply topically nightly.    sertraline (ZOLOFT) 100 MG tablet Take 1 tablet by mouth daily    Multiple Vitamins-Minerals (THERAPEUTIC MULTIVITAMIN-MINERALS) tablet Take 1 tablet by mouth daily     No current

## 2025-06-19 NOTE — PROGRESS NOTES
Chief Complaint   Patient presents with    Medication Check      Vitals:    06/19/25 1433   BP: (!) 138/96   Pulse: 100   Resp: 16   Temp: 97 °F (36.1 °C)   SpO2: 100%      Prior to Admission medications    Medication Sig Start Date End Date Taking? Authorizing Provider   tretinoin (RETIN-A) 0.05 % cream Apply 0.1 g topically nightly Apply topically nightly.   Yes Lucina Sanchez MD   sertraline (ZOLOFT) 50 MG tablet Take 2 tablets by mouth daily Take 1.5 tablets for 1 week then take 2 tablets daily 5/29/25   Genesis Buchanan, DO   Multiple Vitamins-Minerals (THERAPEUTIC MULTIVITAMIN-MINERALS) tablet Take 1 tablet by mouth daily    ProviderLucina MD          6/16/2025     8:30 PM 5/27/2025     6:46 PM 5/13/2025     3:15 PM   PHQ-9    Little interest or pleasure in doing things 1 1 0   Feeling down, depressed, or hopeless 0 0 0   Trouble falling or staying asleep, or sleeping too much 0 0    Feeling tired or having little energy 1 2    Poor appetite or overeating 0 1    Feeling bad about yourself - or that you are a failure or have let yourself or your family down 0 0    Trouble concentrating on things, such as reading the newspaper or watching television 0 0    Moving or speaking so slowly that other people could have noticed. Or the opposite - being so fidgety or restless that you have been moving around a lot more than usual 0 0    Thoughts that you would be better off dead, or of hurting yourself in some way 0 0    PHQ-2 Score 1  1  0   PHQ-9 Total Score 2  4  0   If you checked off any problems, how difficult have these problems made it for you to do your work, take care of things at home, or get along with other people? 1 1        Patient-reported         6/16/2025     8:30 PM 5/27/2025     6:45 PM   MARY-7 SCREENING   Feeling nervous, anxious, or on edge Several days Several days   Not being able to stop or control worrying Several days Several days   Worrying too much about different things Several

## 2025-07-14 ASSESSMENT — ANXIETY QUESTIONNAIRES
4. TROUBLE RELAXING: NOT AT ALL
3. WORRYING TOO MUCH ABOUT DIFFERENT THINGS: SEVERAL DAYS
2. NOT BEING ABLE TO STOP OR CONTROL WORRYING: NOT AT ALL
4. TROUBLE RELAXING: NOT AT ALL
7. FEELING AFRAID AS IF SOMETHING AWFUL MIGHT HAPPEN: NOT AT ALL
IF YOU CHECKED OFF ANY PROBLEMS ON THIS QUESTIONNAIRE, HOW DIFFICULT HAVE THESE PROBLEMS MADE IT FOR YOU TO DO YOUR WORK, TAKE CARE OF THINGS AT HOME, OR GET ALONG WITH OTHER PEOPLE: SOMEWHAT DIFFICULT
1. FEELING NERVOUS, ANXIOUS, OR ON EDGE: SEVERAL DAYS
6. BECOMING EASILY ANNOYED OR IRRITABLE: SEVERAL DAYS
5. BEING SO RESTLESS THAT IT IS HARD TO SIT STILL: NOT AT ALL
6. BECOMING EASILY ANNOYED OR IRRITABLE: SEVERAL DAYS
GAD7 TOTAL SCORE: 3
3. WORRYING TOO MUCH ABOUT DIFFERENT THINGS: SEVERAL DAYS
7. FEELING AFRAID AS IF SOMETHING AWFUL MIGHT HAPPEN: NOT AT ALL
2. NOT BEING ABLE TO STOP OR CONTROL WORRYING: NOT AT ALL
IF YOU CHECKED OFF ANY PROBLEMS ON THIS QUESTIONNAIRE, HOW DIFFICULT HAVE THESE PROBLEMS MADE IT FOR YOU TO DO YOUR WORK, TAKE CARE OF THINGS AT HOME, OR GET ALONG WITH OTHER PEOPLE: SOMEWHAT DIFFICULT
1. FEELING NERVOUS, ANXIOUS, OR ON EDGE: SEVERAL DAYS
5. BEING SO RESTLESS THAT IT IS HARD TO SIT STILL: NOT AT ALL

## 2025-07-14 ASSESSMENT — PATIENT HEALTH QUESTIONNAIRE - PHQ9
3. TROUBLE FALLING OR STAYING ASLEEP: NOT AT ALL
8. MOVING OR SPEAKING SO SLOWLY THAT OTHER PEOPLE COULD HAVE NOTICED. OR THE OPPOSITE - BEING SO FIDGETY OR RESTLESS THAT YOU HAVE BEEN MOVING AROUND A LOT MORE THAN USUAL: NOT AT ALL
5. POOR APPETITE OR OVEREATING: NOT AT ALL
9. THOUGHTS THAT YOU WOULD BE BETTER OFF DEAD, OR OF HURTING YOURSELF: NOT AT ALL
10. IF YOU CHECKED OFF ANY PROBLEMS, HOW DIFFICULT HAVE THESE PROBLEMS MADE IT FOR YOU TO DO YOUR WORK, TAKE CARE OF THINGS AT HOME, OR GET ALONG WITH OTHER PEOPLE: SOMEWHAT DIFFICULT
10. IF YOU CHECKED OFF ANY PROBLEMS, HOW DIFFICULT HAVE THESE PROBLEMS MADE IT FOR YOU TO DO YOUR WORK, TAKE CARE OF THINGS AT HOME, OR GET ALONG WITH OTHER PEOPLE: SOMEWHAT DIFFICULT
SUM OF ALL RESPONSES TO PHQ QUESTIONS 1-9: 1
1. LITTLE INTEREST OR PLEASURE IN DOING THINGS: NOT AT ALL
1. LITTLE INTEREST OR PLEASURE IN DOING THINGS: NOT AT ALL
5. POOR APPETITE OR OVEREATING: NOT AT ALL
7. TROUBLE CONCENTRATING ON THINGS, SUCH AS READING THE NEWSPAPER OR WATCHING TELEVISION: NOT AT ALL
2. FEELING DOWN, DEPRESSED OR HOPELESS: NOT AT ALL
2. FEELING DOWN, DEPRESSED OR HOPELESS: NOT AT ALL
6. FEELING BAD ABOUT YOURSELF - OR THAT YOU ARE A FAILURE OR HAVE LET YOURSELF OR YOUR FAMILY DOWN: NOT AT ALL
9. THOUGHTS THAT YOU WOULD BE BETTER OFF DEAD, OR OF HURTING YOURSELF: NOT AT ALL
3. TROUBLE FALLING OR STAYING ASLEEP: NOT AT ALL
7. TROUBLE CONCENTRATING ON THINGS, SUCH AS READING THE NEWSPAPER OR WATCHING TELEVISION: NOT AT ALL
6. FEELING BAD ABOUT YOURSELF - OR THAT YOU ARE A FAILURE OR HAVE LET YOURSELF OR YOUR FAMILY DOWN: NOT AT ALL
SUM OF ALL RESPONSES TO PHQ QUESTIONS 1-9: 1
8. MOVING OR SPEAKING SO SLOWLY THAT OTHER PEOPLE COULD HAVE NOTICED. OR THE OPPOSITE, BEING SO FIGETY OR RESTLESS THAT YOU HAVE BEEN MOVING AROUND A LOT MORE THAN USUAL: NOT AT ALL
4. FEELING TIRED OR HAVING LITTLE ENERGY: SEVERAL DAYS
SUM OF ALL RESPONSES TO PHQ QUESTIONS 1-9: 1
4. FEELING TIRED OR HAVING LITTLE ENERGY: SEVERAL DAYS
SUM OF ALL RESPONSES TO PHQ QUESTIONS 1-9: 1
SUM OF ALL RESPONSES TO PHQ QUESTIONS 1-9: 1

## 2025-07-16 ASSESSMENT — PATIENT HEALTH QUESTIONNAIRE - PHQ9
SUM OF ALL RESPONSES TO PHQ9 QUESTIONS 1 & 2: 1
1. LITTLE INTEREST OR PLEASURE IN DOING THINGS: SEVERAL DAYS
SUM OF ALL RESPONSES TO PHQ QUESTIONS 1-9: 1
1. LITTLE INTEREST OR PLEASURE IN DOING THINGS: SEVERAL DAYS
2. FEELING DOWN, DEPRESSED OR HOPELESS: NOT AT ALL
SUM OF ALL RESPONSES TO PHQ QUESTIONS 1-9: 1
2. FEELING DOWN, DEPRESSED OR HOPELESS: NOT AT ALL

## 2025-07-17 ENCOUNTER — OFFICE VISIT (OUTPATIENT)
Age: 25
End: 2025-07-17
Payer: MEDICAID

## 2025-07-17 VITALS
SYSTOLIC BLOOD PRESSURE: 121 MMHG | WEIGHT: 157 LBS | TEMPERATURE: 97.6 F | DIASTOLIC BLOOD PRESSURE: 80 MMHG | HEIGHT: 65 IN | BODY MASS INDEX: 26.16 KG/M2 | OXYGEN SATURATION: 100 % | RESPIRATION RATE: 16 BRPM | HEART RATE: 81 BPM

## 2025-07-17 DIAGNOSIS — Z87.59 HISTORY OF POSTPARTUM DEPRESSION: ICD-10-CM

## 2025-07-17 DIAGNOSIS — Z86.59 HISTORY OF POSTPARTUM DEPRESSION: ICD-10-CM

## 2025-07-17 DIAGNOSIS — R41.840 ATTENTION DEFICIT: ICD-10-CM

## 2025-07-17 DIAGNOSIS — F41.1 GAD (GENERALIZED ANXIETY DISORDER): ICD-10-CM

## 2025-07-17 DIAGNOSIS — F33.1 MODERATE EPISODE OF RECURRENT MAJOR DEPRESSIVE DISORDER (HCC): Primary | ICD-10-CM

## 2025-07-17 PROCEDURE — 99214 OFFICE O/P EST MOD 30 MIN: CPT | Performed by: STUDENT IN AN ORGANIZED HEALTH CARE EDUCATION/TRAINING PROGRAM

## 2025-07-17 RX ORDER — SERTRALINE HYDROCHLORIDE 100 MG/1
100 TABLET, FILM COATED ORAL DAILY
Qty: 30 TABLET | Refills: 0 | Status: SHIPPED | OUTPATIENT
Start: 2025-07-17

## 2025-07-17 NOTE — PROGRESS NOTES
Chief Complaint   Patient presents with    Medication Check      Vitals:    07/17/25 1125   BP: 121/80   Pulse: 81   Resp: 16   Temp: 97.6 °F (36.4 °C)   SpO2: 100%      Prior to Admission medications    Medication Sig Start Date End Date Taking? Authorizing Provider   tretinoin (RETIN-A) 0.05 % cream Apply 0.1 g topically nightly Apply topically nightly.    Lucina Sanchez MD   sertraline (ZOLOFT) 100 MG tablet Take 1 tablet by mouth daily 6/19/25   Genesis Buchanan, DO   Multiple Vitamins-Minerals (THERAPEUTIC MULTIVITAMIN-MINERALS) tablet Take 1 tablet by mouth daily    ProviderLucina MD          7/16/2025     3:10 PM 7/14/2025     3:16 PM 6/16/2025     8:30 PM   PHQ-9    Little interest or pleasure in doing things 1 0 1   Feeling down, depressed, or hopeless 0 0 0   Trouble falling or staying asleep, or sleeping too much  0 0   Feeling tired or having little energy  1 1   Poor appetite or overeating  0 0   Feeling bad about yourself - or that you are a failure or have let yourself or your family down  0 0   Trouble concentrating on things, such as reading the newspaper or watching television  0 0   Moving or speaking so slowly that other people could have noticed. Or the opposite - being so fidgety or restless that you have been moving around a lot more than usual  0 0   Thoughts that you would be better off dead, or of hurting yourself in some way  0 0   PHQ-2 Score 1  0  1    PHQ-9 Total Score 1  1  2    If you checked off any problems, how difficult have these problems made it for you to do your work, take care of things at home, or get along with other people?  1 1       Patient-reported         7/14/2025     3:16 PM 6/16/2025     8:30 PM 5/27/2025     6:45 PM   MARY-7 SCREENING   Feeling nervous, anxious, or on edge Several days Several days Several days   Not being able to stop or control worrying Not at all Several days Several days   Worrying too much about different things Several days Several

## 2025-07-17 NOTE — PROGRESS NOTES
Psychiatry Consult Follow Up Note  Reason for consult: anxiety and ADHD  Please see full consult note written on: 5/29/25    PHQ-9 Total Score: (Patient-Rptd) 1 (7/16/2025  3:10 PM)  Thoughts that you would be better off dead, or of hurting yourself in some way: (Patient-Rptd) 0 (7/14/2025  3:16 PM)     Mild   Decrease by 1 point       7/14/2025     3:16 PM 6/16/2025     8:30 PM 5/27/2025     6:45 PM   MARY 7 SCORE   MARY-7 Total Score 3  5  5        Patient-reported     Mild   Decrease by 2 point     Clinical summary of events since last encounter:    Patient states that she is very busy with school. \"I am finishing the term this week, anxious about tests this week\".     Patient states \"I have been studying every day, I have never been a bad test taker\". \"I think sometimes I second guess myself\".     Patient states she feels the medication is \"working, I still have sometimes I have anxious thoughts, about my child, but it is not as often\".     Patient states \"I feel like my focus, once or twice, was like 3-4 things where I have half completed\".     Patient states that her exams end on Friday.     Patient states she has been able to take the medication daily.     Patient denies side effects.     She has been leaning on her boyfriend for tasks in order to spend time studying and preparing for her tests. She is also working daily.     At this time, patient denies SI/HI/AVH.     Patient did not endorse abdominal pain, nausea, vomiting, constipation, diarrhea, paresthesia, akathisia, tremors, headache, vision changes, lightheadedness, dizziness, dry mouth, or dry eyes.     Meds:  Current Outpatient Medications   Medication Sig    tretinoin (RETIN-A) 0.05 % cream Apply 0.1 g topically nightly Apply topically nightly.    sertraline (ZOLOFT) 100 MG tablet Take 1 tablet by mouth daily    Multiple Vitamins-Minerals (THERAPEUTIC MULTIVITAMIN-MINERALS) tablet Take 1 tablet by mouth daily     No current facility-administered

## 2025-08-11 ENCOUNTER — HOSPITAL ENCOUNTER (EMERGENCY)
Facility: HOSPITAL | Age: 25
Discharge: HOME | End: 2025-08-11
Attending: EMERGENCY MEDICINE
Payer: MEDICAID

## 2025-08-11 ENCOUNTER — APPOINTMENT (OUTPATIENT)
Facility: HOSPITAL | Age: 25
End: 2025-08-11
Payer: MEDICAID

## 2025-08-11 VITALS
SYSTOLIC BLOOD PRESSURE: 130 MMHG | OXYGEN SATURATION: 100 % | HEART RATE: 101 BPM | DIASTOLIC BLOOD PRESSURE: 86 MMHG | RESPIRATION RATE: 18 BRPM | TEMPERATURE: 97.8 F

## 2025-08-11 DIAGNOSIS — S09.90XA INJURY OF HEAD, INITIAL ENCOUNTER: ICD-10-CM

## 2025-08-11 DIAGNOSIS — S01.81XA FACIAL LACERATION, INITIAL ENCOUNTER: Primary | ICD-10-CM
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
## 2025-08-11 PROCEDURE — 70450 CT HEAD/BRAIN W/O DYE: CPT

## 2025-08-11 PROCEDURE — 12011 RPR F/E/E/N/L/M 2.5 CM/<: CPT

## 2025-08-11 PROCEDURE — 6370000000 HC RX 637 (ALT 250 FOR IP): Performed by: EMERGENCY MEDICINE

## 2025-08-11 PROCEDURE — 99284 EMERGENCY DEPT VISIT MOD MDM: CPT

## 2025-08-11 PROCEDURE — 6360000002 HC RX W HCPCS: Performed by: EMERGENCY MEDICINE

## 2025-08-11 RX ORDER — LIDOCAINE HYDROCHLORIDE AND EPINEPHRINE BITARTRATE 20; .01 MG/ML; MG/ML
20 INJECTION, SOLUTION SUBCUTANEOUS
Status: COMPLETED | OUTPATIENT
Start: 2025-08-11 | End: 2025-08-11

## 2025-08-11 RX ORDER — GINSENG 100 MG
CAPSULE ORAL
Status: COMPLETED | OUTPATIENT
Start: 2025-08-11 | End: 2025-08-11

## 2025-08-11 RX ADMIN — BACITRACIN: 500 OINTMENT TOPICAL at 03:21

## 2025-08-11 RX ADMIN — LIDOCAINE HYDROCHLORIDE AND EPINEPHRINE 20 ML: 20; 10 INJECTION, SOLUTION INFILTRATION; PERINEURAL at 03:00

## 2025-08-11 ASSESSMENT — PAIN - FUNCTIONAL ASSESSMENT
PAIN_FUNCTIONAL_ASSESSMENT: 0-10
PAIN_FUNCTIONAL_ASSESSMENT: ACTIVITIES ARE NOT PREVENTED

## 2025-08-11 ASSESSMENT — PAIN DESCRIPTION - DESCRIPTORS: DESCRIPTORS: ACHING

## 2025-08-11 ASSESSMENT — PAIN DESCRIPTION - PAIN TYPE: TYPE: ACUTE PAIN

## 2025-08-11 ASSESSMENT — PAIN SCALES - GENERAL: PAINLEVEL_OUTOF10: 4

## 2025-08-11 ASSESSMENT — PAIN DESCRIPTION - ORIENTATION: ORIENTATION: RIGHT

## 2025-08-11 ASSESSMENT — PAIN DESCRIPTION - FREQUENCY: FREQUENCY: CONTINUOUS

## 2025-08-11 ASSESSMENT — PAIN DESCRIPTION - LOCATION: LOCATION: HEAD

## 2025-08-11 ASSESSMENT — PAIN DESCRIPTION - ONSET: ONSET: SUDDEN

## (undated) DEVICE — LARGE VISCERA RETAINER: Brand: VISCERA RETAINER, FISH

## (undated) DEVICE — C-SECTION-SFMC: Brand: MEDLINE INDUSTRIES, INC.

## (undated) DEVICE — DRESSING SIL W4XL5IN ANTIBACT GELLING FBR CYTOFORM

## (undated) DEVICE — PENCIL SMK EVAC 10 FT BLADE ELECTRD ROCKER FOR TELSCP

## (undated) DEVICE — SUTURE VICRYL + SZ 0 L36IN ABSRB VLT L40MM CT 1/2 CIR TAPR VCP358H

## (undated) DEVICE — GOWN,SIRUS,FABRNF,XL,20/CS: Brand: MEDLINE

## (undated) DEVICE — ATTACHMENT SMK 3/8INX10FT VALLEYLAB

## (undated) DEVICE — CANISTER, RIGID, 3000CC: Brand: MEDLINE INDUSTRIES, INC.

## (undated) DEVICE — SOLUTION IRRIG 500ML 0.9% SOD CHLO USP POUR PLAS BTL

## (undated) DEVICE — SYRINGE IRRIG 60ML SFT PLIABLE BLB EZ TO GRP 1 HND USE W/

## (undated) DEVICE — GARMENT,MEDLINE,DVT,INT,CALF,MED, GEN2: Brand: MEDLINE

## (undated) DEVICE — SOLIDIFIER FLD 3.2OZ 3000CC TRAD IN BTL LIQUI-LOC

## (undated) DEVICE — CLEANER ES TIP W2XL2IN ADH BK RADPQ FOR S STL ELECTRD

## (undated) DEVICE — STRAP,POSITIONING,KNEE/BODY,FOAM,4X60": Brand: MEDLINE

## (undated) DEVICE — SUTURE MONOCRYL SZ 4-0 L27IN ABSRB UD L24MM PS-1 3/8 CIR PRIM Y935H

## (undated) DEVICE — PENCIL ES L3M ROCK SWCH S STL HEX LOK BLDE ELECTRD HOLSTER

## (undated) DEVICE — ELECTRODE PT RET AD L9FT HI MOIST COND ADH HYDRGEL CORDED

## (undated) DEVICE — Z DISCONTINUED NO SUB SUTURE PLN GUT SZ 2-0 L27IN ABSRB YELLOWISH TAN L70MM XLH 53T

## (undated) DEVICE — APPLICATOR MEDICATED 26 CC SOLUTION HI LT ORNG CHLORAPREP

## (undated) DEVICE — SUTURE SZ3-0 L36IN VIO ANTIBACT ABSORB CT NDL VICRYL PLUS